# Patient Record
Sex: MALE | Race: BLACK OR AFRICAN AMERICAN | NOT HISPANIC OR LATINO | Employment: UNEMPLOYED | RURAL
[De-identification: names, ages, dates, MRNs, and addresses within clinical notes are randomized per-mention and may not be internally consistent; named-entity substitution may affect disease eponyms.]

---

## 2023-09-21 PROBLEM — E78.5 HYPERLIPIDEMIA: Status: ACTIVE | Noted: 2023-09-21

## 2023-09-27 ENCOUNTER — OFFICE VISIT (OUTPATIENT)
Dept: FAMILY MEDICINE | Facility: CLINIC | Age: 53
End: 2023-09-27
Payer: COMMERCIAL

## 2023-09-27 VITALS
TEMPERATURE: 97 F | HEIGHT: 72 IN | OXYGEN SATURATION: 99 % | SYSTOLIC BLOOD PRESSURE: 141 MMHG | DIASTOLIC BLOOD PRESSURE: 86 MMHG | HEART RATE: 62 BPM | BODY MASS INDEX: 22.08 KG/M2 | WEIGHT: 163 LBS

## 2023-09-27 DIAGNOSIS — Z12.11 ENCOUNTER FOR SCREENING COLONOSCOPY: ICD-10-CM

## 2023-09-27 DIAGNOSIS — E78.5 HYPERLIPIDEMIA, UNSPECIFIED HYPERLIPIDEMIA TYPE: ICD-10-CM

## 2023-09-27 DIAGNOSIS — D17.0 LIPOMA OF HEAD: ICD-10-CM

## 2023-09-27 DIAGNOSIS — D17.9 LIPOMA, UNSPECIFIED SITE: ICD-10-CM

## 2023-09-27 DIAGNOSIS — J44.9 CHRONIC OBSTRUCTIVE PULMONARY DISEASE, UNSPECIFIED COPD TYPE: ICD-10-CM

## 2023-09-27 DIAGNOSIS — M25.551 PAIN OF RIGHT HIP: ICD-10-CM

## 2023-09-27 DIAGNOSIS — Z00.00 HEALTHCARE MAINTENANCE: Primary | ICD-10-CM

## 2023-09-27 DIAGNOSIS — I10 HYPERTENSION, UNSPECIFIED TYPE: ICD-10-CM

## 2023-09-27 LAB
ALBUMIN SERPL BCP-MCNC: 3.6 G/DL (ref 3.5–5)
ALBUMIN/GLOB SERPL: 0.9 {RATIO}
ALP SERPL-CCNC: 113 U/L (ref 45–115)
ALT SERPL W P-5'-P-CCNC: 17 U/L (ref 16–61)
ANION GAP SERPL CALCULATED.3IONS-SCNC: 6 MMOL/L (ref 7–16)
AST SERPL W P-5'-P-CCNC: 19 U/L (ref 15–37)
BASOPHILS # BLD AUTO: 0.01 K/UL (ref 0–0.2)
BASOPHILS NFR BLD AUTO: 0.2 % (ref 0–1)
BILIRUB SERPL-MCNC: 0.4 MG/DL (ref ?–1.2)
BUN SERPL-MCNC: 11 MG/DL (ref 7–18)
BUN/CREAT SERPL: 13 (ref 6–20)
CALCIUM SERPL-MCNC: 9.3 MG/DL (ref 8.5–10.1)
CHLORIDE SERPL-SCNC: 106 MMOL/L (ref 98–107)
CHOLEST SERPL-MCNC: 209 MG/DL (ref 0–200)
CHOLEST/HDLC SERPL: 2.8 {RATIO}
CO2 SERPL-SCNC: 33 MMOL/L (ref 21–32)
CREAT SERPL-MCNC: 0.86 MG/DL (ref 0.7–1.3)
DIFFERENTIAL METHOD BLD: ABNORMAL
EGFR (NO RACE VARIABLE) (RUSH/TITUS): 104 ML/MIN/1.73M2
EOSINOPHIL # BLD AUTO: 0.6 K/UL (ref 0–0.5)
EOSINOPHIL NFR BLD AUTO: 11.1 % (ref 1–4)
ERYTHROCYTE [DISTWIDTH] IN BLOOD BY AUTOMATED COUNT: 14.1 % (ref 11.5–14.5)
EST. AVERAGE GLUCOSE BLD GHB EST-MCNC: 77 MG/DL
GLOBULIN SER-MCNC: 3.8 G/DL (ref 2–4)
GLUCOSE SERPL-MCNC: 82 MG/DL (ref 74–106)
HBA1C MFR BLD HPLC: 4.9 % (ref 4.5–6.6)
HCT VFR BLD AUTO: 44.8 % (ref 40–54)
HCV AB SER QL: NORMAL
HDLC SERPL-MCNC: 75 MG/DL (ref 40–60)
HGB BLD-MCNC: 14.9 G/DL (ref 13.5–18)
HIV 1+O+2 AB SERPL QL: NORMAL
IMM GRANULOCYTES # BLD AUTO: 0.01 K/UL (ref 0–0.04)
IMM GRANULOCYTES NFR BLD: 0.2 % (ref 0–0.4)
LDLC SERPL CALC-MCNC: 111 MG/DL
LDLC/HDLC SERPL: 1.5 {RATIO}
LYMPHOCYTES # BLD AUTO: 1.85 K/UL (ref 1–4.8)
LYMPHOCYTES NFR BLD AUTO: 34.1 % (ref 27–41)
MCH RBC QN AUTO: 29.6 PG (ref 27–31)
MCHC RBC AUTO-ENTMCNC: 33.3 G/DL (ref 32–36)
MCV RBC AUTO: 88.9 FL (ref 80–96)
MONOCYTES # BLD AUTO: 0.53 K/UL (ref 0–0.8)
MONOCYTES NFR BLD AUTO: 9.8 % (ref 2–6)
MPC BLD CALC-MCNC: 9.9 FL (ref 9.4–12.4)
NEUTROPHILS # BLD AUTO: 2.42 K/UL (ref 1.8–7.7)
NEUTROPHILS NFR BLD AUTO: 44.6 % (ref 53–65)
NONHDLC SERPL-MCNC: 134 MG/DL
NRBC # BLD AUTO: 0 X10E3/UL
NRBC, AUTO (.00): 0 %
PLATELET # BLD AUTO: 362 K/UL (ref 150–400)
POTASSIUM SERPL-SCNC: 4.2 MMOL/L (ref 3.5–5.1)
PROT SERPL-MCNC: 7.4 G/DL (ref 6.4–8.2)
RBC # BLD AUTO: 5.04 M/UL (ref 4.6–6.2)
SODIUM SERPL-SCNC: 141 MMOL/L (ref 136–145)
TRIGL SERPL-MCNC: 117 MG/DL (ref 35–150)
VLDLC SERPL-MCNC: 23 MG/DL
WBC # BLD AUTO: 5.42 K/UL (ref 4.5–11)

## 2023-09-27 PROCEDURE — 3044F HG A1C LEVEL LT 7.0%: CPT | Mod: CPTII,,, | Performed by: FAMILY MEDICINE

## 2023-09-27 PROCEDURE — 99203 OFFICE O/P NEW LOW 30 MIN: CPT | Mod: ,,, | Performed by: FAMILY MEDICINE

## 2023-09-27 PROCEDURE — 3079F PR MOST RECENT DIASTOLIC BLOOD PRESSURE 80-89 MM HG: ICD-10-PCS | Mod: CPTII,,, | Performed by: FAMILY MEDICINE

## 2023-09-27 PROCEDURE — 3008F PR BODY MASS INDEX (BMI) DOCUMENTED: ICD-10-PCS | Mod: CPTII,,, | Performed by: FAMILY MEDICINE

## 2023-09-27 PROCEDURE — 3008F BODY MASS INDEX DOCD: CPT | Mod: CPTII,,, | Performed by: FAMILY MEDICINE

## 2023-09-27 PROCEDURE — 83036 HEMOGLOBIN A1C: ICD-10-PCS | Mod: ,,, | Performed by: CLINICAL MEDICAL LABORATORY

## 2023-09-27 PROCEDURE — 3077F PR MOST RECENT SYSTOLIC BLOOD PRESSURE >= 140 MM HG: ICD-10-PCS | Mod: CPTII,,, | Performed by: FAMILY MEDICINE

## 2023-09-27 PROCEDURE — 87389 HIV-1 AG W/HIV-1&-2 AB AG IA: CPT | Mod: ,,, | Performed by: CLINICAL MEDICAL LABORATORY

## 2023-09-27 PROCEDURE — 80053 COMPREHEN METABOLIC PANEL: CPT | Mod: ,,, | Performed by: CLINICAL MEDICAL LABORATORY

## 2023-09-27 PROCEDURE — 3077F SYST BP >= 140 MM HG: CPT | Mod: CPTII,,, | Performed by: FAMILY MEDICINE

## 2023-09-27 PROCEDURE — 85025 COMPLETE CBC W/AUTO DIFF WBC: CPT | Mod: ,,, | Performed by: CLINICAL MEDICAL LABORATORY

## 2023-09-27 PROCEDURE — 86803 HEPATITIS C ANTIBODY: ICD-10-PCS | Mod: ,,, | Performed by: CLINICAL MEDICAL LABORATORY

## 2023-09-27 PROCEDURE — 3044F PR MOST RECENT HEMOGLOBIN A1C LEVEL <7.0%: ICD-10-PCS | Mod: CPTII,,, | Performed by: FAMILY MEDICINE

## 2023-09-27 PROCEDURE — 80061 LIPID PANEL: ICD-10-PCS | Mod: ,,, | Performed by: CLINICAL MEDICAL LABORATORY

## 2023-09-27 PROCEDURE — 80061 LIPID PANEL: CPT | Mod: ,,, | Performed by: CLINICAL MEDICAL LABORATORY

## 2023-09-27 PROCEDURE — 86803 HEPATITIS C AB TEST: CPT | Mod: ,,, | Performed by: CLINICAL MEDICAL LABORATORY

## 2023-09-27 PROCEDURE — 85025 CBC WITH DIFFERENTIAL: ICD-10-PCS | Mod: ,,, | Performed by: CLINICAL MEDICAL LABORATORY

## 2023-09-27 PROCEDURE — 83036 HEMOGLOBIN GLYCOSYLATED A1C: CPT | Mod: ,,, | Performed by: CLINICAL MEDICAL LABORATORY

## 2023-09-27 PROCEDURE — 99203 PR OFFICE/OUTPT VISIT, NEW, LEVL III, 30-44 MIN: ICD-10-PCS | Mod: ,,, | Performed by: FAMILY MEDICINE

## 2023-09-27 PROCEDURE — 87389 HIV 1 / 2 ANTIBODY: ICD-10-PCS | Mod: ,,, | Performed by: CLINICAL MEDICAL LABORATORY

## 2023-09-27 PROCEDURE — 1159F PR MEDICATION LIST DOCUMENTED IN MEDICAL RECORD: ICD-10-PCS | Mod: CPTII,,, | Performed by: FAMILY MEDICINE

## 2023-09-27 PROCEDURE — 3079F DIAST BP 80-89 MM HG: CPT | Mod: CPTII,,, | Performed by: FAMILY MEDICINE

## 2023-09-27 PROCEDURE — 1159F MED LIST DOCD IN RCRD: CPT | Mod: CPTII,,, | Performed by: FAMILY MEDICINE

## 2023-09-27 PROCEDURE — 80053 COMPREHENSIVE METABOLIC PANEL: ICD-10-PCS | Mod: ,,, | Performed by: CLINICAL MEDICAL LABORATORY

## 2023-09-27 RX ORDER — DIMENHYDRINATE 50 MG
50 TABLET ORAL NIGHTLY PRN
COMMUNITY
End: 2023-10-25

## 2023-09-27 RX ORDER — CALCIUM ACETATE 667 MG/1
667 CAPSULE ORAL
COMMUNITY
End: 2024-01-03 | Stop reason: SDUPTHER

## 2023-09-27 RX ORDER — ALBUTEROL SULFATE 90 UG/1
2 AEROSOL, METERED RESPIRATORY (INHALATION) EVERY 4 HOURS PRN
Qty: 18 G | Refills: 1 | Status: SHIPPED | OUTPATIENT
Start: 2023-09-27 | End: 2023-10-27

## 2023-09-27 RX ORDER — OLANZAPINE 10 MG/1
10 TABLET ORAL NIGHTLY
COMMUNITY
End: 2023-10-25

## 2023-09-27 NOTE — PROGRESS NOTES
[address] [facility], [place] [address]  Phone: [phone]     Subjective     Name: Juan Mckeon   YOB: 1970 (52 y.o.)  MRN: [id number]  Visit Date: 9/27/2023   Chief Complaint: Establish Care and Hip Pain (Severe pain in right hip)        HISTORY OF PRESENT ILLNESS:  Mr. Juan Mckeon is a 52 y.o. male with a PMHx of hyperlipidemia, HTN, COPD, & GERD who is present in clinic today to establish care and for severe right hip pain. He was previously being seen by the [facility] but he has insurance now and would like to be seen here. He has multiple conditions going on and has not been on any medication for ~ 1 month now.     Right Hip Pain: His R hip pain has been going on for ~2 years but he recently fell off of a 20 ft ladder while painting a house 2 months ago and it has gotten very severe. He went to the ER at [facility] and did x-rays and he had multiple rib fractures. He was also told that he would need to get a hip replacement and was referred to Ortho but his ins wouldn't cover it. He rates the pain as a 10/10, nothing seems to help it, radiates to his lower back and his RLQ, and shoots down the back of his leg. Patient is clearly in severe pain during evaluation of his R hip. He also hears a loud popping sound in his right groin region. We will get lumbar and hip plain films on him in clinic today and send him another referral to Ortho now that he has ins.     COPD: He has been smoking 1/2 pack of cigarettes everyday for the past 32 years. He was on albuterol previously to control his COPD, we will restart his albuterol and obtain low dose chest CT screening.     HTN: Notes he has history of HTN but has not been on medication. BP elevated today at 141/86, wait on lab results before starting on medication, f/u in 1 week.     Lipoma: Has a large mass on the back of his head that has been there since 2001 with some surrounding masses starting to form. Not painful usually but will  sometimes cause pain that radiates to the front of his left face. Referral to surgery for removal.       PAST MEDICAL HISTORY:  Significant Diagnoses - Patient  has a past medical history of Anxiety, Coronary artery disease, Depression, GERD (gastroesophageal reflux disease), Hyperlipidemia, and Hypertension.  Medications - Patient has a current medication list which includes the following long-term medication(s): albuterol, calcium acetate(phosphat bind), and olanzapine.   Allergies - Patient is allergic to castellani paint [phenol] and nuts [tree nut].  Surgeries - Patient  has a past surgical history that includes Skin graft and Joint replacement.  Family History - Patient family history includes Arthritis in his paternal grandmother; Cancer in his father, maternal aunt, maternal grandmother, and mother.      SOCIAL HISTORY:  Tobacco - Patient  reports that he has been smoking cigarettes. He has never used smokeless tobacco.   Alcohol - Patient  reports current alcohol use.   Recreational Drugs - Patient  reports that he does not currently use drugs.       Review of Systems   Constitutional:  Positive for fatigue. Negative for chills and fever.   HENT:  Negative for nasal congestion, ear pain and rhinorrhea.    Respiratory:  Positive for shortness of breath.    Cardiovascular:  Positive for chest pain.   Gastrointestinal:  Positive for reflux. Negative for abdominal pain, constipation, diarrhea, nausea and vomiting.   Musculoskeletal:  Positive for back pain, gait problem and leg pain.        Past Medical History:   Diagnosis Date    Anxiety     Coronary artery disease     Depression     GERD (gastroesophageal reflux disease)     Hyperlipidemia     Hypertension         Review of patient's allergies indicates:   Allergen Reactions    Castellani paint [phenol]     Nuts [tree nut]         Past Surgical History:   Procedure Laterality Date    JOINT REPLACEMENT      SKIN GRAFT          Family History    Problem Relation Age of Onset    Cancer Mother     Cancer Father     Cancer Maternal Aunt     Cancer Maternal Grandmother     Arthritis Paternal Grandmother        Current Outpatient Medications   Medication Instructions    albuterol (PROVENTIL/VENTOLIN HFA) 90 mcg/actuation inhaler 2 puffs, Every 4 hours PRN    calcium acetate(phosphat bind) (PHOSLO) 667 mg, Oral, 3 times daily with meals    dimenhyDRINATE (DRAMAMINE) 50 mg, Oral, Nightly PRN    doxycycline (VIBRAMYCIN) 100 mg, Oral, 2 times daily    HYDROcodone-acetaminophen (NORCO) 7.5-325 mg per tablet 1 tablet, Oral, Every 6 hours PRN    OLANZapine (ZYPREXA) 10 mg, Oral, Nightly    predniSONE (DELTASONE) 40 mg, Oral        Objective     BP (!) 141/86 (BP Location: Right arm, Patient Position: Sitting, BP Method: Medium (Automatic))   Pulse 62   Temp 97.1 °F (36.2 °C) (Temporal)   Ht 6' (1.829 m)   Wt 73.9 kg (163 lb)   SpO2 99%   BMI 22.11 kg/m²     Physical Exam  Constitutional:       Appearance: Normal appearance. He is normal weight. He is not ill-appearing, toxic-appearing or diaphoretic.   HENT:      Head: Normocephalic.      Right Ear: External ear normal.      Left Ear: External ear normal.      Nose: Nose normal. No congestion or rhinorrhea.   Eyes:      Extraocular Movements: Extraocular movements intact.      Conjunctiva/sclera: Conjunctivae normal.      Pupils: Pupils are equal, round, and reactive to light.   Cardiovascular:      Rate and Rhythm: Normal rate.      Pulses: Normal pulses.      Heart sounds: Normal heart sounds. No murmur heard.     No friction rub. No gallop.   Pulmonary:      Effort: Pulmonary effort is normal. No respiratory distress.      Breath sounds: No stridor. No wheezing, rhonchi or rales.   Chest:      Chest wall: No tenderness.   Abdominal:      General: Abdomen is flat. Bowel sounds are normal.      Palpations: Abdomen is soft.   Musculoskeletal:         General: Tenderness and signs of injury present.       Comments: Right upper hip/groin tenderness to palpation, decreased ROM. Unable to obtain straight leg raise test due to severe pain. No numbness or swelling down bilateral LE's. Sensation intact bilateral LE's.    Skin:     General: Skin is warm.      Coloration: Skin is not jaundiced or pale.      Findings: No bruising, erythema, lesion or rash.   Neurological:      General: No focal deficit present.      Mental Status: He is alert.        All recently obtained labs have been reviewed and discussed in detail with the patient.   Assessment     1. Healthcare maintenance    2. Chronic obstructive pulmonary disease, unspecified COPD type    3. Hypertension, unspecified type    4. Hyperlipidemia, unspecified hyperlipidemia type    5. Lipoma, unspecified site    6. Pain of right hip         Plan     Problem List Items Addressed This Visit          Cardiac/Vascular    Hyperlipidemia    Relevant Orders    Lipid Panel     Other Visit Diagnoses       Healthcare maintenance    -  Primary    Relevant Orders    CBC Auto Differential    Comprehensive Metabolic Panel    Lipid Panel    Hemoglobin A1C    (In Office Administered) Zoster Recombinant Vaccine    Influenza - Quadrivalent *Preferred* (6 months+) (PF)    HIV 1/2 Ag/Ab (4th Gen)    Hepatitis C Antibody    Colonoscopy    CT Chest Lung Screening Low Dose    EGD    Chronic obstructive pulmonary disease, unspecified COPD type        Hypertension, unspecified type        Lipoma, unspecified site        Relevant Orders    Ambulatory referral/consult to General Surgery    Pain of right hip        Relevant Orders    X-Ray Lumbar Spine 5 View    X-Ray Hip 2 or 3 views Right (with Pelvis when performed)              All orders:  Orders Placed This Encounter    X-Ray Lumbar Spine 5 View    X-Ray Hip 2 or 3 views Right (with Pelvis when performed)    CT Chest Lung Screening Low Dose    (In Office Administered) Zoster Recombinant Vaccine    Influenza - Quadrivalent  *Preferred* (6 months+) (PF)    CBC Auto Differential    Comprehensive Metabolic Panel    Lipid Panel    Hemoglobin A1C    HIV 1/2 Ag/Ab (4th Gen)    Hepatitis C Antibody    Ambulatory referral/consult to General Surgery    Colonoscopy    EGD          No follow-ups on file.    Instructed patient that if symptoms fail to improve or worsen patient should seek immediate medical attention or report to the nearest emergency department. Patient expressed verbal agreement and understanding to this plan of care.

## 2023-09-27 NOTE — ASSESSMENT & PLAN NOTE
Lipid panel, HbA1c, CMP, CBC, HIV screening, Hep C screening, schedule colonoscopy, schedule low dose chest CT  Flu shot and Shingles vaccine given today

## 2023-09-27 NOTE — ASSESSMENT & PLAN NOTE
Acute on chronic right hip pain for 2 years with a new injury 2 months ago. Plain films done in clinic today.   Referral to Orthopedics for further evaluation.

## 2023-09-27 NOTE — ASSESSMENT & PLAN NOTE
Obtain lipid panel today, last one done in 4/2022 with elevated Triglycerides, HDL, and LDL   Will consider starting medication once results are back

## 2023-09-27 NOTE — PROGRESS NOTES
Subjective:       Patient ID: Juan Mckeon is a 52 y.o. male.    Chief Complaint: Establish Care and Hip Pain (Severe pain in right hip)    Mr. Juan Mckeon is a 52 y.o. male with a PMHx of hyperlipidemia, HTN, COPD, & GERD who is present in clinic today to establish care and for severe right hip pain. [facility] but he has insurance now and would like to be seen here. He has multiple conditions going on and has not been on any medication for ~ 1 month now.      Right Hip Pain: His R hip pain has been going on for ~2 years but he recently fell off of a 20 ft ladder while painting a house 2 months ago and it has gotten very severe. He went to the ER at [facility] and did x-rays and he had multiple rib fractures. He was also told that he would need to get a hip replacement and was referred to Ortho but his ins wouldn't cover it. He rates the pain as a 10/10, nothing seems to help it, radiates to his lower back and his RLQ, and shoots down the back of his leg. Patient is clearly in severe pain during evaluation of his R hip. He also hears a loud popping sound in his right groin region. Lumbar and hip plain films obtained in clinic today.  We will send another referral to Ortho.      COPD: He has been smoking 1/2 pack of cigarettes everyday for the past 32 years. He was on albuterol previously to control his COPD, we will restart his albuterol and obtain low dose chest CT screening.      HTN: Notes he has history of HTN but has not been on medication. BP elevated today at 141/86, wait on lab results before starting on medication, f/u in 1 week.      Lipoma: Has a large mass on the back of his head that has been there since 2001 with some surrounding masses starting to form. Not painful usually but will sometimes cause pain that radiates to the front of his left face. Referral to surgery for removal.      Current Outpatient Medications:     albuterol (PROVENTIL/VENTOLIN HFA)  90 mcg/actuation inhaler, 2 puffs every 4 (four) hours as needed., Disp: , Rfl:     calcium acetate,phosphat bind, (PHOSLO) 667 mg capsule, Take 667 mg by mouth 3 (three) times daily with meals., Disp: , Rfl:     dimenhyDRINATE (DRAMAMINE) 50 MG tablet, Take 50 mg by mouth nightly as needed., Disp: , Rfl:     doxycycline (VIBRAMYCIN) 100 MG Cap, Take 100 mg by mouth 2 (two) times daily., Disp: , Rfl:     HYDROcodone-acetaminophen (NORCO) 7.5-325 mg per tablet, Take 1 tablet by mouth every 6 (six) hours as needed., Disp: , Rfl:     OLANZapine (ZYPREXA) 10 MG tablet, Take 10 mg by mouth every evening., Disp: , Rfl:     predniSONE (DELTASONE) 20 MG tablet, Take 40 mg by mouth., Disp: , Rfl:     Review of patient's allergies indicates:   Allergen Reactions    Castellani paint [phenol]     Nuts [tree nut]        Past Medical History:   Diagnosis Date    Anxiety     Coronary artery disease     Depression     GERD (gastroesophageal reflux disease)     Hyperlipidemia     Hypertension        Past Surgical History:   Procedure Laterality Date    JOINT REPLACEMENT      SKIN GRAFT         Family History   Problem Relation Age of Onset    Cancer Mother     Cancer Father     Cancer Maternal Aunt     Cancer Maternal Grandmother     Arthritis Paternal Grandmother        Social History     Tobacco Use    Smoking status: Every Day     Types: Cigarettes    Smokeless tobacco: Never   Substance Use Topics    Alcohol use: Yes    Drug use: Not Currently       Review of Systems   Constitutional:  Positive for fatigue. Negative for fever.   Respiratory:  Positive for shortness of breath. Negative for cough.    Gastrointestinal:  Negative for constipation, diarrhea, nausea and vomiting.   Musculoskeletal:  Positive for arthralgias, back pain, gait problem and myalgias.         Objective:      Vitals:    09/27/23 0913 09/27/23 0923   BP: (!) 143/83 (!) 141/86   BP Location: Right arm Right arm   Patient Position: Sitting Sitting   BP Method:  Medium (Automatic) Medium (Automatic)   Pulse: (!) 58 62   Temp: 97.1 °F (36.2 °C)    TempSrc: Temporal    SpO2: 98% 99%   Weight: 73.9 kg (163 lb)    Height: 6' (1.829 m)      Physical Exam  Vitals and nursing note reviewed.   Constitutional:       General: He is not in acute distress.     Appearance: Normal appearance. He is normal weight. He is not ill-appearing, toxic-appearing or diaphoretic.   HENT:      Head: Normocephalic.      Comments: 2.5 x 2.5 cm soft mass on left posterior scalp with 2-3 surrounding masses. Non mobile, no tenderness to palpation.      Right Ear: External ear normal.      Left Ear: External ear normal.      Nose: Nose normal. No congestion or rhinorrhea.   Eyes:      Extraocular Movements: Extraocular movements intact.      Conjunctiva/sclera: Conjunctivae normal.      Pupils: Pupils are equal, round, and reactive to light.   Cardiovascular:      Rate and Rhythm: Normal rate and regular rhythm.      Pulses: Normal pulses.      Heart sounds: Normal heart sounds. No murmur heard.     No friction rub. No gallop.   Pulmonary:      Effort: Pulmonary effort is normal. No respiratory distress.      Breath sounds: No stridor. No wheezing, rhonchi or rales.   Chest:      Chest wall: No tenderness.   Abdominal:      General: Abdomen is flat. Bowel sounds are normal.      Palpations: Abdomen is soft.   Musculoskeletal:         General: Tenderness present.      Comments: Tenderness to palpation on right anterior proximal femoral area. No tenderness to bilateral lumbar region. Seated leg raise test deferred due to severe pain leaning back. Restricted ROM, gait difficulty. No numbness or swelling of bilateral LE's. Sensation intact bilateral LE's.    Skin:     General: Skin is warm.      Coloration: Skin is not jaundiced or pale.      Findings: No bruising, erythema, lesion or rash.   Neurological:      Mental Status: He is alert.       Lab Results   Component Value Date    WBC 6.73 04/12/2022    HGB  13.7 04/12/2022    HCT 42.3 04/12/2022     04/12/2022    CHOL 223 (H) 04/12/2022    TRIG 343 (H) 04/12/2022    HDL 75 (H) 04/12/2022    ALT 21 04/12/2022    AST 21 04/12/2022     07/12/2022    K 4.4 07/12/2022     07/12/2022    CREATININE 1.06 07/12/2022    BUN 11 07/12/2022    CO2 27 07/12/2022    TSH 0.923 07/12/2022      Assessment:       1. Healthcare maintenance    2. Chronic obstructive pulmonary disease, unspecified COPD type    3. Hypertension, unspecified type    4. Hyperlipidemia, unspecified hyperlipidemia type    5. Lipoma, unspecified site    6. Pain of right hip        Plan:         Problem List Items Addressed This Visit          Pulmonary    COPD (chronic obstructive pulmonary disease)     Chronic smoker  Start Albuterol             Cardiac/Vascular    Hyperlipidemia     Obtain lipid panel today, last one done in 4/2022 with elevated Triglycerides, HDL, and LDL   Will consider starting medication once results are back         Relevant Orders    Lipid Panel    Hypertension     Monitor blood pressure   F/u in 1 week for re-evaluation             Oncology    Lipoma     Lipoma on left posterior scalp.   Referral to General Surgery.          Relevant Orders    Ambulatory referral/consult to General Surgery       Orthopedic    Pain of right hip     Acute on chronic right hip pain for 2 years with a new injury 2 months ago. Plain films done in clinic today.   Referral to Orthopedics for further evaluation.          Relevant Orders    X-Ray Lumbar Spine 5 View    X-Ray Hip 2 or 3 views Right (with Pelvis when performed)    Ambulatory referral/consult to Orthopedics       Other    Healthcare maintenance - Primary     Lipid panel, HbA1c, CMP, CBC, HIV screening, Hep C screening, schedule colonoscopy, schedule low dose chest CT  Flu shot and Shingles vaccine given today          Relevant Orders    CBC Auto Differential    Comprehensive Metabolic Panel    Lipid Panel    Hemoglobin A1C    (In  Office Administered) Zoster Recombinant Vaccine    Influenza - Quadrivalent *Preferred* (6 months+) (PF)    HIV 1/2 Ag/Ab (4th Gen)    Hepatitis C Antibody    CT Chest Lung Screening Low Dose    EGD    Ambulatory referral/consult to Gastroenterology         Follow up in about 1 week (around 10/4/2023) for HTN, right hip pain, health maintenance, medication mgmt .    Molly Posey MD

## 2023-09-29 DIAGNOSIS — Z12.11 SCREENING FOR COLON CANCER: Primary | ICD-10-CM

## 2023-09-29 RX ORDER — POLYETHYLENE GLYCOL 3350, SODIUM SULFATE ANHYDROUS, SODIUM BICARBONATE, SODIUM CHLORIDE, POTASSIUM CHLORIDE 236; 22.74; 6.74; 5.86; 2.97 G/4L; G/4L; G/4L; G/4L; G/4L
4 POWDER, FOR SOLUTION ORAL ONCE
Qty: 4000 ML | Refills: 0 | Status: SHIPPED | OUTPATIENT
Start: 2023-09-29 | End: 2023-09-29

## 2023-10-04 ENCOUNTER — OFFICE VISIT (OUTPATIENT)
Dept: FAMILY MEDICINE | Facility: CLINIC | Age: 53
End: 2023-10-04
Payer: COMMERCIAL

## 2023-10-04 VITALS
HEIGHT: 72 IN | DIASTOLIC BLOOD PRESSURE: 84 MMHG | BODY MASS INDEX: 22.08 KG/M2 | HEART RATE: 73 BPM | SYSTOLIC BLOOD PRESSURE: 137 MMHG | WEIGHT: 163 LBS | TEMPERATURE: 97 F | OXYGEN SATURATION: 95 %

## 2023-10-04 DIAGNOSIS — M87.051 AVASCULAR NECROSIS OF BONE OF RIGHT HIP: ICD-10-CM

## 2023-10-04 DIAGNOSIS — Z23 NEED FOR INFLUENZA VACCINATION: Primary | ICD-10-CM

## 2023-10-04 DIAGNOSIS — Z00.00 HEALTHCARE MAINTENANCE: ICD-10-CM

## 2023-10-04 PROCEDURE — 90471 FLU VACCINE (QUAD) GREATER THAN OR EQUAL TO 3YO PRESERVATIVE FREE IM: ICD-10-PCS | Mod: GC,,, | Performed by: FAMILY MEDICINE

## 2023-10-04 PROCEDURE — 3008F PR BODY MASS INDEX (BMI) DOCUMENTED: ICD-10-PCS | Mod: CPTII,,, | Performed by: FAMILY MEDICINE

## 2023-10-04 PROCEDURE — 1159F MED LIST DOCD IN RCRD: CPT | Mod: CPTII,,, | Performed by: FAMILY MEDICINE

## 2023-10-04 PROCEDURE — 99213 PR OFFICE/OUTPT VISIT, EST, LEVL III, 20-29 MIN: ICD-10-PCS | Mod: 25,GC,, | Performed by: FAMILY MEDICINE

## 2023-10-04 PROCEDURE — 1159F PR MEDICATION LIST DOCUMENTED IN MEDICAL RECORD: ICD-10-PCS | Mod: CPTII,,, | Performed by: FAMILY MEDICINE

## 2023-10-04 PROCEDURE — 3075F PR MOST RECENT SYSTOLIC BLOOD PRESS GE 130-139MM HG: ICD-10-PCS | Mod: CPTII,,, | Performed by: FAMILY MEDICINE

## 2023-10-04 PROCEDURE — 3075F SYST BP GE 130 - 139MM HG: CPT | Mod: CPTII,,, | Performed by: FAMILY MEDICINE

## 2023-10-04 PROCEDURE — 3044F PR MOST RECENT HEMOGLOBIN A1C LEVEL <7.0%: ICD-10-PCS | Mod: CPTII,,, | Performed by: FAMILY MEDICINE

## 2023-10-04 PROCEDURE — 99213 OFFICE O/P EST LOW 20 MIN: CPT | Mod: 25,GC,, | Performed by: FAMILY MEDICINE

## 2023-10-04 PROCEDURE — 3079F PR MOST RECENT DIASTOLIC BLOOD PRESSURE 80-89 MM HG: ICD-10-PCS | Mod: CPTII,,, | Performed by: FAMILY MEDICINE

## 2023-10-04 PROCEDURE — 3079F DIAST BP 80-89 MM HG: CPT | Mod: CPTII,,, | Performed by: FAMILY MEDICINE

## 2023-10-04 PROCEDURE — 90686 FLU VACCINE (QUAD) GREATER THAN OR EQUAL TO 3YO PRESERVATIVE FREE IM: ICD-10-PCS | Mod: GC,,, | Performed by: FAMILY MEDICINE

## 2023-10-04 PROCEDURE — 90677 PNEUMOCOCCAL CONJUGATE VACCINE 20-VALENT: ICD-10-PCS | Mod: ,,, | Performed by: FAMILY MEDICINE

## 2023-10-04 PROCEDURE — 3008F BODY MASS INDEX DOCD: CPT | Mod: CPTII,,, | Performed by: FAMILY MEDICINE

## 2023-10-04 PROCEDURE — 90686 IIV4 VACC NO PRSV 0.5 ML IM: CPT | Mod: GC,,, | Performed by: FAMILY MEDICINE

## 2023-10-04 PROCEDURE — 90472 IMMUNIZATION ADMIN EACH ADD: CPT | Mod: GC,,, | Performed by: FAMILY MEDICINE

## 2023-10-04 PROCEDURE — 3044F HG A1C LEVEL LT 7.0%: CPT | Mod: CPTII,,, | Performed by: FAMILY MEDICINE

## 2023-10-04 PROCEDURE — 90677 PCV20 VACCINE IM: CPT | Mod: ,,, | Performed by: FAMILY MEDICINE

## 2023-10-04 PROCEDURE — 90471 IMMUNIZATION ADMIN: CPT | Mod: GC,,, | Performed by: FAMILY MEDICINE

## 2023-10-04 PROCEDURE — 90472 PNEUMOCOCCAL CONJUGATE VACCINE 20-VALENT: ICD-10-PCS | Mod: GC,,, | Performed by: FAMILY MEDICINE

## 2023-10-04 RX ORDER — HYDROCODONE BITARTRATE AND ACETAMINOPHEN 7.5; 325 MG/1; MG/1
1 TABLET ORAL EVERY 6 HOURS PRN
Qty: 30 TABLET | Refills: 0 | Status: SHIPPED | OUTPATIENT
Start: 2023-10-04 | End: 2023-10-04

## 2023-10-04 RX ORDER — DICLOFENAC SODIUM 10 MG/G
2 GEL TOPICAL 4 TIMES DAILY
Qty: 100 G | Refills: 0 | OUTPATIENT
Start: 2023-10-04 | End: 2023-10-04

## 2023-10-04 RX ORDER — HYDROCODONE BITARTRATE AND ACETAMINOPHEN 7.5; 325 MG/1; MG/1
1 TABLET ORAL EVERY 6 HOURS PRN
Qty: 30 TABLET | Refills: 0 | Status: SHIPPED | OUTPATIENT
Start: 2023-10-04 | End: 2023-10-12

## 2023-10-04 RX ORDER — DICLOFENAC SODIUM 10 MG/G
2 GEL TOPICAL 4 TIMES DAILY
Qty: 100 G | Refills: 0 | Status: SHIPPED | OUTPATIENT
Start: 2023-10-04 | End: 2023-10-04

## 2023-10-04 RX ORDER — DICLOFENAC SODIUM 10 MG/G
2 GEL TOPICAL 4 TIMES DAILY
Qty: 100 G | Refills: 0 | Status: ON HOLD | OUTPATIENT
Start: 2023-10-04 | End: 2023-10-30 | Stop reason: HOSPADM

## 2023-10-04 NOTE — PROGRESS NOTES
Subjective:       Patient ID: Juan Mckeon is a 52 y.o. male.    Chief Complaint: Follow-up, COPD, Hyperlipidemia, Hypertension, and Hip Pain    Pt is a 51 y/o male who states his hip pain is getting worse. Last week an R hip xray was done demonstrating Avascular necrosis of R hip secondary to fracture with severe  arthritis.  Pt reports pain is severe and he is not sleeping much secondary to increased pain.  On Exam Pt is N/V intact at Right toes/L toes.   Pt returns this week to review initial labs and Catch up on Care gaps. Pt will receive influenza and pneumococcal vaccination today.Pt has Colonoscopy scheduled for March 21 2024. Low dose CT scheduled as well  I contacted Ortho ([facility]) wh has an appointment scheduled for 10/10/23 to evaluated hip. Pt will be RX diclofenac and a 8 days supply Of norco. Pt is aware Norco will not be prescribed again at this clinic. Pt will be referred to pain management if necessary. Pt reports he will keep appointment with Ortho.  CBC,CMP,A1C, Hep c screening, HIV screening discussed with pt at visit. No changes in current plan.        Current Outpatient Medications:     albuterol (PROVENTIL/VENTOLIN HFA) 90 mcg/actuation inhaler, Inhale 2 puffs into the lungs every 4 (four) hours as needed for Wheezing., Disp: 18 g, Rfl: 1    calcium acetate,phosphat bind, (PHOSLO) 667 mg capsule, Take 667 mg by mouth 3 (three) times daily with meals., Disp: , Rfl:     dimenhyDRINATE (DRAMAMINE) 50 MG tablet, Take 50 mg by mouth nightly as needed., Disp: , Rfl:     OLANZapine (ZYPREXA) 10 MG tablet, Take 10 mg by mouth every evening., Disp: , Rfl:     diclofenac sodium (VOLTAREN) 1 % Gel, Apply 2 g topically 4 (four) times daily. for 14 days, Disp: 100 g, Rfl: 0    HYDROcodone-acetaminophen (NORCO) 7.5-325 mg per tablet, Take 1 tablet by mouth every 6 (six) hours as needed for Pain., Disp: 30 tablet, Rfl: 0    Review of patient's allergies indicates:   Allergen Reactions    Castellani paint  [phenol]     Nuts [tree nut]        Past Medical History:   Diagnosis Date    Anxiety     Coronary artery disease     Depression     GERD (gastroesophageal reflux disease)     Hyperlipidemia     Hypertension        Past Surgical History:   Procedure Laterality Date    JOINT REPLACEMENT      SKIN GRAFT         Family History   Problem Relation Age of Onset    Cancer Mother     Cancer Father     Cancer Maternal Aunt     Cancer Maternal Grandmother     Arthritis Paternal Grandmother        Social History     Tobacco Use    Smoking status: Every Day     Types: Cigarettes    Smokeless tobacco: Never   Substance Use Topics    Alcohol use: Yes    Drug use: Not Currently       Review of Systems   Constitutional:  Negative for diaphoresis, fatigue, fever and unexpected weight change.   HENT:  Negative for rhinorrhea, sinus pressure/congestion and sneezing.    Respiratory:  Negative for cough, chest tightness, shortness of breath and wheezing.    Cardiovascular:  Negative for chest pain, palpitations and leg swelling.   Gastrointestinal:  Negative for constipation, diarrhea, nausea and vomiting.   Genitourinary:  Negative for difficulty urinating.   Musculoskeletal:  Positive for arthralgias, gait problem and leg pain. Negative for back pain.   Neurological:  Negative for dizziness, weakness, light-headedness and headaches.           Objective:      Vitals:    10/04/23 1021   BP: 137/84   BP Location: Left arm   Patient Position: Sitting   BP Method: Medium (Automatic)   Pulse: 73   Temp: 97.1 °F (36.2 °C)   TempSrc: Temporal   SpO2: 95%   Weight: 73.9 kg (163 lb)   Height: 6' (1.829 m)     Physical Exam  Constitutional:       Appearance: Normal appearance.   HENT:      Head: Normocephalic and atraumatic.      Right Ear: External ear normal.      Left Ear: External ear normal.      Mouth/Throat:      Mouth: Mucous membranes are moist.      Pharynx: Oropharynx is clear.   Eyes:      Extraocular Movements: Extraocular movements  intact.      Pupils: Pupils are equal, round, and reactive to light.   Cardiovascular:      Rate and Rhythm: Normal rate and regular rhythm.      Heart sounds: Normal heart sounds.   Pulmonary:      Effort: Pulmonary effort is normal.      Breath sounds: Normal breath sounds.   Abdominal:      Palpations: Abdomen is soft.   Musculoskeletal:      Cervical back: Neck supple.      Comments: Severe limitation of ROM Right hip. No erythema no edema right hip. N/V intact at R toes   Skin:     General: Skin is warm and dry.      Capillary Refill: Capillary refill takes less than 2 seconds.   Neurological:      Mental Status: He is alert and oriented to person, place, and time.   Psychiatric:         Mood and Affect: Mood normal.         Behavior: Behavior normal.         Lab Results   Component Value Date    WBC 5.42 09/27/2023    HGB 14.9 09/27/2023    HCT 44.8 09/27/2023     09/27/2023    CHOL 209 (H) 09/27/2023    TRIG 117 09/27/2023    HDL 75 (H) 09/27/2023    ALT 17 09/27/2023    AST 19 09/27/2023     09/27/2023    K 4.2 09/27/2023     09/27/2023    CREATININE 0.86 09/27/2023    BUN 11 09/27/2023    CO2 33 (H) 09/27/2023    TSH 0.923 07/12/2022    HGBA1C 4.9 09/27/2023      Assessment:       1. Need for influenza vaccination    2. Healthcare maintenance    3. Avascular necrosis of bone of right hip        Plan:         Problem List Items Addressed This Visit          ID    Need for influenza vaccination - Primary    Relevant Orders    Influenza - Quadrivalent *Preferred* (6 months+) (PF) (Completed)       Orthopedic    Avascular necrosis of bone of right hip     Norco, diclofenac  Keep appointment with ORTHO on 10/10/23         Relevant Medications    HYDROcodone-acetaminophen (NORCO) 7.5-325 mg per tablet       Other    Healthcare maintenance    Relevant Orders    (In Office Administered) Pneumococcal Conjugate Vaccine (20 Valent) (IM) (Preferred) (Completed)         Follow up in about 3 months  (around 1/4/2024).    Molly Posey MD

## 2023-10-10 ENCOUNTER — OFFICE VISIT (OUTPATIENT)
Dept: ORTHOPEDICS | Facility: CLINIC | Age: 53
End: 2023-10-10
Payer: COMMERCIAL

## 2023-10-10 VITALS — HEIGHT: 72 IN | BODY MASS INDEX: 21.67 KG/M2 | WEIGHT: 160 LBS

## 2023-10-10 DIAGNOSIS — M25.551 PAIN OF RIGHT HIP: ICD-10-CM

## 2023-10-10 DIAGNOSIS — M87.051 AVASCULAR NECROSIS OF BONE OF RIGHT HIP: Primary | ICD-10-CM

## 2023-10-10 DIAGNOSIS — Z01.811 PRE-OPERATIVE RESPIRATORY EXAMINATION: ICD-10-CM

## 2023-10-10 DIAGNOSIS — Z01.812 PRE-OPERATIVE LABORATORY EXAMINATION: ICD-10-CM

## 2023-10-10 DIAGNOSIS — Z01.810 PRE-OPERATIVE CARDIOVASCULAR EXAMINATION: ICD-10-CM

## 2023-10-10 PROCEDURE — 1159F MED LIST DOCD IN RCRD: CPT | Mod: CPTII,,, | Performed by: ORTHOPAEDIC SURGERY

## 2023-10-10 PROCEDURE — 99204 OFFICE O/P NEW MOD 45 MIN: CPT | Mod: S$PBB,,, | Performed by: ORTHOPAEDIC SURGERY

## 2023-10-10 PROCEDURE — 1159F PR MEDICATION LIST DOCUMENTED IN MEDICAL RECORD: ICD-10-PCS | Mod: CPTII,,, | Performed by: ORTHOPAEDIC SURGERY

## 2023-10-10 PROCEDURE — 3008F BODY MASS INDEX DOCD: CPT | Mod: CPTII,,, | Performed by: ORTHOPAEDIC SURGERY

## 2023-10-10 PROCEDURE — 99204 PR OFFICE/OUTPT VISIT, NEW, LEVL IV, 45-59 MIN: ICD-10-PCS | Mod: S$PBB,,, | Performed by: ORTHOPAEDIC SURGERY

## 2023-10-10 PROCEDURE — 3044F PR MOST RECENT HEMOGLOBIN A1C LEVEL <7.0%: ICD-10-PCS | Mod: CPTII,,, | Performed by: ORTHOPAEDIC SURGERY

## 2023-10-10 PROCEDURE — 3008F PR BODY MASS INDEX (BMI) DOCUMENTED: ICD-10-PCS | Mod: CPTII,,, | Performed by: ORTHOPAEDIC SURGERY

## 2023-10-10 PROCEDURE — 3044F HG A1C LEVEL LT 7.0%: CPT | Mod: CPTII,,, | Performed by: ORTHOPAEDIC SURGERY

## 2023-10-10 PROCEDURE — 99215 OFFICE O/P EST HI 40 MIN: CPT | Mod: PBBFAC | Performed by: ORTHOPAEDIC SURGERY

## 2023-10-10 NOTE — PATIENT INSTRUCTIONS
Surgery Instructions     Your surgery is scheduled for 10/30/23 at [facility] on the   ground floor of the Ambulatory building. You should arrive at 5:30 at the   Ambulatory Care Center located at [address].    Pre Op Testing: TODAY- 20      ____ Lab  (1st floor clinic)   ____ EKG  (2nd floor clinic)  ____ Chest xray (Imaging Center)     Pre Op Clearance: DR. MORFIN 10/25 @ 9:30    Our office will contact you the day before surgery with your arrival time.  DO NOT eat or drink anything after midnight the night before surgery (this includes gum, candy, chewing tobacco, etc).  You CAN NOT drive after surgery, please arrange for someone to drive you.  Bring all medication in their original bottles.  Bathe with Hibiclens the night or morning before your surgery.  The morning of your surgery ONLY take blood pressure, heart, acid reflux, or thyroid (if you take a morning dose) medication with a sip of water.   Be sure to have stopped your blood thinner medication at the appropriate time, as instructed.  Bring your C-Pap machine if you have one.  All jewelry, piercings, or false eyelashes MUST be removed prior to surgery.

## 2023-10-10 NOTE — PROGRESS NOTES
CC:   Chief Complaint   Patient presents with    Right Hip - Pain        PREVIOUS INFO:        HISTORY:   10/10/2023    Juan Mckeon  is a 52 y.o. 52-year-old black male it has been on a cane for 6 months with right hip pain it has been hurting for at least 2 years he is had x-rays showing AVN with the subchondral fracture collapse the right femoral head is referred in      PAST MEDICAL HISTORY:   Past Medical History:   Diagnosis Date    Anxiety     Coronary artery disease     Depression     GERD (gastroesophageal reflux disease)     Hyperlipidemia     Hypertension           PAST SURGICAL HISTORY:   Past Surgical History:   Procedure Laterality Date    SKIN GRAFT            ALLERGIES:   Review of patient's allergies indicates:   Allergen Reactions    Castellani paint [phenol]     Nuts [tree nut]         MEDICATIONS :    Current Outpatient Medications:     albuterol (PROVENTIL/VENTOLIN HFA) 90 mcg/actuation inhaler, Inhale 2 puffs into the lungs every 4 (four) hours as needed for Wheezing., Disp: 18 g, Rfl: 1    calcium acetate,phosphat bind, (PHOSLO) 667 mg capsule, Take 667 mg by mouth 3 (three) times daily with meals., Disp: , Rfl:     diclofenac sodium (VOLTAREN) 1 % Gel, Apply 2 g topically 4 (four) times daily. for 14 days, Disp: 100 g, Rfl: 0    dimenhyDRINATE (DRAMAMINE) 50 MG tablet, Take 50 mg by mouth nightly as needed., Disp: , Rfl:     HYDROcodone-acetaminophen (NORCO) 7.5-325 mg per tablet, Take 1 tablet by mouth every 6 (six) hours as needed for Pain., Disp: 30 tablet, Rfl: 0    OLANZapine (ZYPREXA) 10 MG tablet, Take 10 mg by mouth every evening., Disp: , Rfl:      SOCIAL HISTORY:   Social History     Socioeconomic History    Marital status:    Tobacco Use    Smoking status: Every Day     Types: Cigarettes    Smokeless tobacco: Never   Substance and Sexual Activity    Alcohol use: Yes    Drug use: Not Currently    Sexual activity: Yes        ROS    FAMILY HISTORY:   Family  History   Problem Relation Age of Onset    Cancer Mother     Cancer Father     Cancer Maternal Aunt     Cancer Maternal Grandmother     Arthritis Paternal Grandmother           PHYSICAL EXAM: There were no vitals filed for this visit.            Body mass index is 21.7 kg/m².     In general, this is a well-developed, well-nourished male . The patient is alert, oriented and cooperative.      HEENT:  Normocephalic, atraumatic.  Extraocular movements are intact bilaterally.  The oropharynx is benign.       NECK:  Nontender with good range of motion.      PULMONARY: Respirations are even and non-labored.       CARDIOVASCULAR: Pulses regular by peripheral palpation.       ABDOMEN:  Soft, non-tender, non-distended.        EXTREMITIES:  Right lower extremity palpable he has 90° of forward flexion of his hip 20° of external rotation 5° internal rotation all with pain    Ortho Exam      RADIOGRAPHIC FINDINGS:  No x-rays today right hip x-rays reviewed show femoral head AVN with collapse subchondral fracture      .      IMPRESSION:  Right hip AVN with subchondral fracture and collapse risks benefits discussed at length total hip replacement    PLAN:  Right total hip replacement long rehab course discussed he does desire proceed we will get a clearance and set him up for right total hip      I had a long discussion with the patient about treatment options, including operative and nonoperative treatments. We discussed pros and cons of each including risks pertinent to surgery including pain, infection, bleeding, damage to adjacent structures like nerves and blood vessels, failure to heal, need for future surgeries, stiffness, instability, loss of limb, anesthesia risks like stroke, blood clot, loss of life. We discussed the possibility of need for later hardware removal in the case that hardware was used. We discussed common and uncommon risks, and discussed patient specific factors that may increase the risks present with  surgery. All questions were answered. The patient expressed understanding of the pros and cons of surgery and wanted to proceed with surgical treatment.         Deacon Gu III      (Subject to voice recognition error, transcription service not allowed)

## 2023-10-20 ENCOUNTER — CLINICAL SUPPORT (OUTPATIENT)
Dept: CARDIOLOGY | Facility: CLINIC | Age: 53
End: 2023-10-20
Payer: COMMERCIAL

## 2023-10-20 ENCOUNTER — HOSPITAL ENCOUNTER (OUTPATIENT)
Dept: RADIOLOGY | Facility: HOSPITAL | Age: 53
Discharge: HOME | End: 2023-10-20
Attending: ORTHOPAEDIC SURGERY
Payer: COMMERCIAL

## 2023-10-20 DIAGNOSIS — Z01.810 PRE-OPERATIVE CARDIOVASCULAR EXAMINATION: ICD-10-CM

## 2023-10-20 DIAGNOSIS — Z01.811 PRE-OPERATIVE RESPIRATORY EXAMINATION: ICD-10-CM

## 2023-10-20 PROCEDURE — 93005 ELECTROCARDIOGRAM TRACING: CPT | Mod: PBBFAC | Performed by: INTERNAL MEDICINE

## 2023-10-20 PROCEDURE — 93010 ELECTROCARDIOGRAM REPORT: CPT | Mod: S$PBB,,, | Performed by: INTERNAL MEDICINE

## 2023-10-20 PROCEDURE — 85730 THROMBOPLASTIN TIME PARTIAL: CPT | Performed by: ORTHOPAEDIC SURGERY

## 2023-10-20 PROCEDURE — 85610 PROTHROMBIN TIME: CPT | Performed by: ORTHOPAEDIC SURGERY

## 2023-10-20 PROCEDURE — 71046 X-RAY EXAM CHEST 2 VIEWS: CPT | Mod: 26,,, | Performed by: RADIOLOGY

## 2023-10-20 PROCEDURE — 71046 X-RAY EXAM CHEST 2 VIEWS: CPT | Mod: TC

## 2023-10-20 PROCEDURE — 99212 OFFICE O/P EST SF 10 MIN: CPT | Mod: PBBFAC,25

## 2023-10-20 PROCEDURE — 71046 XR CHEST PA AND LATERAL: ICD-10-PCS | Mod: 26,,, | Performed by: RADIOLOGY

## 2023-10-20 PROCEDURE — 93010 EKG 12-LEAD: ICD-10-PCS | Mod: S$PBB,,, | Performed by: INTERNAL MEDICINE

## 2023-10-23 ENCOUNTER — HOSPITAL ENCOUNTER (EMERGENCY)
Facility: HOSPITAL | Age: 53
Discharge: HOME | End: 2023-10-23
Payer: COMMERCIAL

## 2023-10-23 ENCOUNTER — TELEPHONE (OUTPATIENT)
Dept: ORTHOPEDICS | Facility: CLINIC | Age: 53
End: 2023-10-23
Payer: COMMERCIAL

## 2023-10-23 VITALS
BODY MASS INDEX: 21.67 KG/M2 | DIASTOLIC BLOOD PRESSURE: 85 MMHG | HEIGHT: 72 IN | HEART RATE: 73 BPM | WEIGHT: 160 LBS | OXYGEN SATURATION: 96 % | RESPIRATION RATE: 12 BRPM | TEMPERATURE: 99 F | SYSTOLIC BLOOD PRESSURE: 147 MMHG

## 2023-10-23 DIAGNOSIS — R07.9 CHEST PAIN: ICD-10-CM

## 2023-10-23 DIAGNOSIS — M25.519 SHOULDER PAIN, UNSPECIFIED CHRONICITY, UNSPECIFIED LATERALITY: Primary | ICD-10-CM

## 2023-10-23 DIAGNOSIS — J18.9 PNEUMONIA OF LEFT LOWER LOBE DUE TO INFECTIOUS ORGANISM: Primary | ICD-10-CM

## 2023-10-23 LAB
ALBUMIN SERPL BCP-MCNC: 3.6 G/DL (ref 3.5–5)
ALBUMIN/GLOB SERPL: 0.9 {RATIO}
ALP SERPL-CCNC: 117 U/L (ref 45–115)
ALT SERPL W P-5'-P-CCNC: 16 U/L (ref 16–61)
AMPHET UR QL SCN: POSITIVE
ANION GAP SERPL CALCULATED.3IONS-SCNC: 14 MMOL/L (ref 7–16)
APTT PPP: 34.3 SECONDS (ref 25.2–37.3)
AST SERPL W P-5'-P-CCNC: 16 U/L (ref 15–37)
BARBITURATES UR QL SCN: NEGATIVE
BASOPHILS # BLD AUTO: 0.02 K/UL (ref 0–0.2)
BASOPHILS NFR BLD AUTO: 0.3 % (ref 0–1)
BENZODIAZ METAB UR QL SCN: NEGATIVE
BILIRUB SERPL-MCNC: 0.4 MG/DL (ref ?–1.2)
BUN SERPL-MCNC: 7 MG/DL (ref 7–18)
BUN/CREAT SERPL: 6 (ref 6–20)
CALCIUM SERPL-MCNC: 9.4 MG/DL (ref 8.5–10.1)
CANNABINOIDS UR QL SCN: POSITIVE
CHLORIDE SERPL-SCNC: 106 MMOL/L (ref 98–107)
CO2 SERPL-SCNC: 25 MMOL/L (ref 21–32)
COCAINE UR QL SCN: NEGATIVE
CREAT SERPL-MCNC: 1.15 MG/DL (ref 0.7–1.3)
D DIMER PPP FEU-MCNC: 0.33 ΜG/ML (ref 0–0.47)
DIFFERENTIAL METHOD BLD: ABNORMAL
EGFR (NO RACE VARIABLE) (RUSH/TITUS): 77 ML/MIN/1.73M2
EOSINOPHIL # BLD AUTO: 1.34 K/UL (ref 0–0.5)
EOSINOPHIL NFR BLD AUTO: 20.3 % (ref 1–4)
ERYTHROCYTE [DISTWIDTH] IN BLOOD BY AUTOMATED COUNT: 13 % (ref 11.5–14.5)
GLOBULIN SER-MCNC: 3.9 G/DL (ref 2–4)
GLUCOSE SERPL-MCNC: 89 MG/DL (ref 74–106)
HCT VFR BLD AUTO: 41.2 % (ref 40–54)
HGB BLD-MCNC: 14.6 G/DL (ref 13.5–18)
IMM GRANULOCYTES # BLD AUTO: 0.02 K/UL (ref 0–0.04)
IMM GRANULOCYTES NFR BLD: 0.3 % (ref 0–0.4)
INR BLD: 0.88
LYMPHOCYTES # BLD AUTO: 2.06 K/UL (ref 1–4.8)
LYMPHOCYTES NFR BLD AUTO: 31.3 % (ref 27–41)
MAGNESIUM SERPL-MCNC: 2 MG/DL (ref 1.7–2.3)
MCH RBC QN AUTO: 29.7 PG (ref 27–31)
MCHC RBC AUTO-ENTMCNC: 35.4 G/DL (ref 32–36)
MCV RBC AUTO: 83.9 FL (ref 80–96)
MONOCYTES # BLD AUTO: 0.56 K/UL (ref 0–0.8)
MONOCYTES NFR BLD AUTO: 8.5 % (ref 2–6)
MPC BLD CALC-MCNC: 9.3 FL (ref 9.4–12.4)
NEUTROPHILS # BLD AUTO: 2.59 K/UL (ref 1.8–7.7)
NEUTROPHILS NFR BLD AUTO: 39.3 % (ref 53–65)
NRBC # BLD AUTO: 0 X10E3/UL
NRBC, AUTO (.00): 0 %
NT-PROBNP SERPL-MCNC: 39 PG/ML (ref 1–125)
OPIATES UR QL SCN: NEGATIVE
PCP UR QL SCN: NEGATIVE
PLATELET # BLD AUTO: 364 K/UL (ref 150–400)
POTASSIUM SERPL-SCNC: 3.9 MMOL/L (ref 3.5–5.1)
PROT SERPL-MCNC: 7.5 G/DL (ref 6.4–8.2)
PROTHROMBIN TIME: 11.9 SECONDS (ref 11.7–14.7)
RBC # BLD AUTO: 4.91 M/UL (ref 4.6–6.2)
SODIUM SERPL-SCNC: 141 MMOL/L (ref 136–145)
TROPONIN I SERPL DL<=0.01 NG/ML-MCNC: 19.6 PG/ML
TROPONIN I SERPL DL<=0.01 NG/ML-MCNC: 20.2 PG/ML
WBC # BLD AUTO: 6.59 K/UL (ref 4.5–11)

## 2023-10-23 PROCEDURE — 99284 EMERGENCY DEPT VISIT MOD MDM: CPT | Mod: ,,, | Performed by: NURSE PRACTITIONER

## 2023-10-23 PROCEDURE — 85730 THROMBOPLASTIN TIME PARTIAL: CPT | Performed by: NURSE PRACTITIONER

## 2023-10-23 PROCEDURE — 85379 FIBRIN DEGRADATION QUANT: CPT | Performed by: NURSE PRACTITIONER

## 2023-10-23 PROCEDURE — 85610 PROTHROMBIN TIME: CPT | Performed by: NURSE PRACTITIONER

## 2023-10-23 PROCEDURE — 25000003 PHARM REV CODE 250: Performed by: NURSE PRACTITIONER

## 2023-10-23 PROCEDURE — 80053 COMPREHEN METABOLIC PANEL: CPT | Performed by: NURSE PRACTITIONER

## 2023-10-23 PROCEDURE — 96375 TX/PRO/DX INJ NEW DRUG ADDON: CPT

## 2023-10-23 PROCEDURE — 85025 COMPLETE CBC W/AUTO DIFF WBC: CPT | Performed by: NURSE PRACTITIONER

## 2023-10-23 PROCEDURE — 83880 ASSAY OF NATRIURETIC PEPTIDE: CPT | Performed by: NURSE PRACTITIONER

## 2023-10-23 PROCEDURE — 99284 PR EMERGENCY DEPT VISIT,LEVEL IV: ICD-10-PCS | Mod: ,,, | Performed by: NURSE PRACTITIONER

## 2023-10-23 PROCEDURE — 25000242 PHARM REV CODE 250 ALT 637 W/ HCPCS: Performed by: NURSE PRACTITIONER

## 2023-10-23 PROCEDURE — 99285 EMERGENCY DEPT VISIT HI MDM: CPT | Mod: 25

## 2023-10-23 PROCEDURE — 83735 ASSAY OF MAGNESIUM: CPT | Performed by: NURSE PRACTITIONER

## 2023-10-23 PROCEDURE — 81001 URINALYSIS AUTO W/SCOPE: CPT | Performed by: NURSE PRACTITIONER

## 2023-10-23 PROCEDURE — 63600175 PHARM REV CODE 636 W HCPCS: Performed by: NURSE PRACTITIONER

## 2023-10-23 PROCEDURE — 96365 THER/PROPH/DIAG IV INF INIT: CPT

## 2023-10-23 PROCEDURE — 96374 THER/PROPH/DIAG INJ IV PUSH: CPT | Mod: 59

## 2023-10-23 PROCEDURE — 80307 DRUG TEST PRSMV CHEM ANLYZR: CPT | Performed by: NURSE PRACTITIONER

## 2023-10-23 PROCEDURE — 84484 ASSAY OF TROPONIN QUANT: CPT | Performed by: NURSE PRACTITIONER

## 2023-10-23 RX ORDER — IPRATROPIUM BROMIDE AND ALBUTEROL SULFATE 2.5; .5 MG/3ML; MG/3ML
3 SOLUTION RESPIRATORY (INHALATION)
Status: COMPLETED | OUTPATIENT
Start: 2023-10-23 | End: 2023-10-23

## 2023-10-23 RX ORDER — CHLOPHEDIANOL HCL AND PYRILAMINE MALEATE 12.5; 12.5 MG/5ML; MG/5ML
5 SOLUTION ORAL EVERY 8 HOURS PRN
Qty: 110 ML | Refills: 0 | Status: SHIPPED | OUTPATIENT
Start: 2023-10-23 | End: 2023-10-25

## 2023-10-23 RX ORDER — METHYLPREDNISOLONE 4 MG/1
TABLET ORAL
Qty: 1 EACH | Refills: 0 | Status: SHIPPED | OUTPATIENT
Start: 2023-10-23 | End: 2024-01-03

## 2023-10-23 RX ORDER — DEXAMETHASONE SODIUM PHOSPHATE 4 MG/ML
4 INJECTION, SOLUTION INTRA-ARTICULAR; INTRALESIONAL; INTRAMUSCULAR; INTRAVENOUS; SOFT TISSUE
Status: COMPLETED | OUTPATIENT
Start: 2023-10-23 | End: 2023-10-23

## 2023-10-23 RX ORDER — MORPHINE SULFATE 4 MG/ML
4 INJECTION, SOLUTION INTRAMUSCULAR; INTRAVENOUS
Status: COMPLETED | OUTPATIENT
Start: 2023-10-23 | End: 2023-10-23

## 2023-10-23 RX ORDER — CEFDINIR 300 MG/1
300 CAPSULE ORAL 2 TIMES DAILY
Qty: 20 CAPSULE | Refills: 0 | Status: SHIPPED | OUTPATIENT
Start: 2023-10-23 | End: 2023-11-02

## 2023-10-23 RX ORDER — ASPIRIN 325 MG
325 TABLET ORAL
Status: COMPLETED | OUTPATIENT
Start: 2023-10-23 | End: 2023-10-23

## 2023-10-23 RX ORDER — ONDANSETRON 2 MG/ML
4 INJECTION INTRAMUSCULAR; INTRAVENOUS
Status: COMPLETED | OUTPATIENT
Start: 2023-10-23 | End: 2023-10-23

## 2023-10-23 RX ORDER — BENZONATATE 100 MG/1
100 CAPSULE ORAL 3 TIMES DAILY PRN
Qty: 20 CAPSULE | Refills: 0 | Status: SHIPPED | OUTPATIENT
Start: 2023-10-23 | End: 2023-11-02

## 2023-10-23 RX ORDER — ALBUTEROL SULFATE 90 UG/1
1-2 AEROSOL, METERED RESPIRATORY (INHALATION) EVERY 6 HOURS PRN
Qty: 8 G | Refills: 0 | Status: SHIPPED | OUTPATIENT
Start: 2023-10-23 | End: 2024-01-03 | Stop reason: SDUPTHER

## 2023-10-23 RX ADMIN — ONDANSETRON 4 MG: 2 INJECTION INTRAMUSCULAR; INTRAVENOUS at 03:10

## 2023-10-23 RX ADMIN — MORPHINE SULFATE 4 MG: 4 INJECTION, SOLUTION INTRAMUSCULAR; INTRAVENOUS at 03:10

## 2023-10-23 RX ADMIN — DEXAMETHASONE SODIUM PHOSPHATE 4 MG: 4 INJECTION, SOLUTION INTRA-ARTICULAR; INTRALESIONAL; INTRAMUSCULAR; INTRAVENOUS; SOFT TISSUE at 04:10

## 2023-10-23 RX ADMIN — IPRATROPIUM BROMIDE AND ALBUTEROL SULFATE 3 ML: 2.5; .5 SOLUTION RESPIRATORY (INHALATION) at 04:10

## 2023-10-23 RX ADMIN — DEXTROSE MONOHYDRATE 1 G: 5 INJECTION INTRAVENOUS at 04:10

## 2023-10-23 RX ADMIN — ASPIRIN 325 MG ORAL TABLET 325 MG: 325 PILL ORAL at 04:10

## 2023-10-23 NOTE — DISCHARGE INSTRUCTIONS
Take medications as prescribed.  Follow up with primary care provider in 2 days if symptoms do not improve with treatment.  Return to the ER with new or worsening symptoms.

## 2023-10-23 NOTE — ED TRIAGE NOTES
Presents to ED via EMS from home for c/o chest pain and SOB that began this morning. Patient states he got flu shot last week in left arm and pain is now in his chest. Patient has hx of COPD, has been out of inhaler.

## 2023-10-23 NOTE — TELEPHONE ENCOUNTER
SPOKE TO THE PATIENT ABOUT HIS SHOULDER. THE PATIENT CALLED AND SAID HE WAS HURTING IN HIS SHOULDER AFTER HAVING A FLU/ MILA SHOT. WE SENT PT RX TO OUTPATIENT THERAPY- THE PATIENT STATES THAT HE IS HOMELESS AT THE PRESENT TIME. I TOLD HIM WE WOULD HAVE TO POSTPONE HIS TOTAL HIP SURGERY UNTIL HE COULD MAKE SOME ARRANGEMENTS FOR SOMEWHERE TO STAY AFTERWARD- HE WILL NEED HELP AFTER SURGERY.

## 2023-10-23 NOTE — ED PROVIDER NOTES
Encounter Date: 10/23/2023       History     Chief Complaint   Patient presents with    Chest Pain    Shortness of Breath    Cough     Patient presents to the ER via EMS with complaint of chest pain shortness of breath.  Patient reports his symptoms have been ongoing for 2-3 days.  He states his symptoms have worsened today.  He describes pain in his left chest that is sharp stabbing type pain that radiates into his left arm.  Patient denies recent illness or accident.  He reports he was scheduled for right hip surgery by Dr. Gu on October 30th.  He states he was chronic pain with his right hip.  Patient reports history of hypertension but states he was taken off all of his medications.    The history is provided by the patient and the EMS personnel. No  was used.     Review of patient's allergies indicates:   Allergen Reactions    Shellfish containing products Shortness Of Breath and Swelling    Castellani paint [phenol]     Nuts [tree nut]      Past Medical History:   Diagnosis Date    Anxiety and depression     Avascular necrosis of bone of right hip 10/4/2023    Pt has had hip pain for more than 2 years. Pt reports recent fall approximately 2 mos ago. States he was evaluated at [facility]. Instructed to follow up with ORTHO but since has not been able to do so.     COPD (chronic obstructive pulmonary disease) 9/27/2023    Coronary artery disease     GERD (gastroesophageal reflux disease)     Hyperlipidemia     Hypertension      Past Surgical History:   Procedure Laterality Date    HIP REPLACEMENT ARTHROPLASTY Right 10/30/2023    Procedure: ARTHROPLASTY, HIP REPLACEMENT;  Surgeon: Deacon Gu III, MD;  Location: [facility];  Service: Orthopedics;  Laterality: Right;    SKIN GRAFT       Family History   Problem Relation Age of Onset    Cancer Mother     Cancer Father     Cancer Maternal Aunt     Cancer Maternal Grandmother     Arthritis Paternal Grandmother      Social History      Tobacco Use    Smoking status: Every Day     Types: Cigarettes    Smokeless tobacco: Never   Substance Use Topics    Alcohol use: Yes    Drug use: Not Currently     Review of Systems   Constitutional:  Positive for activity change, appetite change and fatigue.   Respiratory:  Positive for shortness of breath.    Cardiovascular:  Positive for chest pain.   Gastrointestinal:  Positive for nausea and vomiting.   Psychiatric/Behavioral:  The patient is nervous/anxious.    All other systems reviewed and are negative.      Physical Exam     Initial Vitals [10/23/23 1449]   BP Pulse Resp Temp SpO2   (!) 142/89 (!) 111 (!) 30 98.6 °F (37 °C) 96 %      MAP       --         Physical Exam    Nursing note and vitals reviewed.  Constitutional: Vital signs are normal. He appears well-developed and well-nourished. He is cooperative. He has a sickly appearance.   HENT:   Head: Normocephalic.   Right Ear: Hearing, tympanic membrane, external ear and ear canal normal.   Left Ear: Hearing, tympanic membrane, external ear and ear canal normal.   Nose: Nose normal.   Mouth/Throat: Uvula is midline, oropharynx is clear and moist and mucous membranes are normal.   Eyes: EOM are normal.   Neck:   Normal range of motion.  Cardiovascular:  Normal rate, regular rhythm, normal heart sounds and intact distal pulses.           Pulmonary/Chest: Breath sounds normal.   Abdominal: Abdomen is soft. Bowel sounds are normal.   Musculoskeletal:         General: Normal range of motion.      Cervical back: Normal range of motion.     Neurological: He is alert and oriented to person, place, and time. He has normal strength. GCS score is 15. GCS eye subscore is 4. GCS verbal subscore is 5. GCS motor subscore is 6.   Skin: Skin is warm and dry. Capillary refill takes less than 2 seconds.   Psychiatric: He has a normal mood and affect. His behavior is normal. Judgment and thought content normal.         Medical Screening Exam   See Full Note    ED Course    Procedures  Labs Reviewed   COMPREHENSIVE METABOLIC PANEL - Abnormal; Notable for the following components:       Result Value    Alk Phos 117 (*)     All other components within normal limits   URINALYSIS, REFLEX TO URINE CULTURE - Abnormal; Notable for the following components:    Protein, UA 10 (*)     Blood, UA Small (*)     All other components within normal limits   CBC WITH DIFFERENTIAL - Abnormal; Notable for the following components:    MPV 9.3 (*)     Neutrophils % 39.3 (*)     Monocytes % 8.5 (*)     Eosinophils % 20.3 (*)     Eosinophils, Absolute 1.34 (*)     All other components within normal limits   DRUG SCREEN, URINE (BEAKER) - Abnormal; Notable for the following components:    Amphetamine, Urine Positive (*)     Cannabinoid, Urine Positive (*)     All other components within normal limits   URINALYSIS, MICROSCOPIC - Abnormal; Notable for the following components:    Squamous Epithelial Cells, UA Occasional (*)     Hyaline Casts, UA 2-5 (*)     Mucous Occasional (*)     All other components within normal limits   MAGNESIUM - Normal   PROTIME-INR - Normal   TROPONIN I - Normal   NT-PRO NATRIURETIC PEPTIDE - Normal   D DIMER, QUANTITATIVE - Normal   APTT - Normal   TROPONIN I - Normal   CBC W/ AUTO DIFFERENTIAL    Narrative:     The following orders were created for panel order CBC auto differential.  Procedure                               Abnormality         Status                     ---------                               -----------         ------                     CBC with Differential[[id number]]       Abnormal            Final result                 Please view results for these tests on the individual orders.          Imaging Results              X-Ray Chest AP Portable (Final result)  Result time 10/23/23 15:28:57      Final result by Matt Bull MD (10/23/23 15:28:57)                   Impression:      Mild left basilar atelectasis/infiltrate.  This could represent pneumonia.   Clinical correlation is requested.  Recommend radiographic follow-up to resolution      Electronically signed by: Matt Bull  Date:    10/23/2023  Time:    15:28               Narrative:    EXAMINATION:  XR CHEST AP PORTABLE    CLINICAL HISTORY:  chest pain;.    COMPARISON:  October 20, 2023    TECHNIQUE:  AP portable erect chest    FINDINGS:  Cardiac silhouette is not enlarged.  There is no mediastinal mass.  There is no pulmonary vascular engorgement.    There is some increased mild patchy and strandy parenchymal density in the left lower lung compared to the previous study.  Lungs and pleural spaces are otherwise clear.    Osseous structures are similar                                       Medications   aspirin tablet 325 mg (325 mg Oral Given 10/23/23 1600)   morphine injection 4 mg (4 mg Intravenous Given 10/23/23 1559)   ondansetron injection 4 mg (4 mg Intravenous Given 10/23/23 1559)   dexAMETHasone injection 4 mg (4 mg Intravenous Given 10/23/23 1649)   albuterol-ipratropium 2.5 mg-0.5 mg/3 mL nebulizer solution 3 mL (3 mLs Nebulization Given 10/23/23 1650)   cefTRIAXone (ROCEPHIN) 1 g in dextrose 5 % in water (D5W) 100 mL IVPB (MB+) (0 g Intravenous Stopped 10/23/23 1720)     Medical Decision Making  Patient presents to the ER via EMS with complaint of chest pain shortness of breath.  Patient reports his symptoms have been ongoing for 2-3 days.  He states his symptoms have worsened today.  He describes pain in his left chest that is sharp stabbing type pain that radiates into his left arm.  Patient denies recent illness or accident.  He reports he was scheduled for right hip surgery by Dr. Gu on October 30th.  He states he was chronic pain with his right hip.  Patient reports history of hypertension but states he was taken off all of his medications.      Amount and/or Complexity of Data Reviewed  Labs: ordered.  Radiology: ordered.  ECG/medicine tests: ordered. Decision-making details  documented in ED Course.     Details: Sinus tachycardia rate of 105bpm reviewed by Dr Nino at 1448.  Discussion of management or test interpretation with external provider(s): Aspirin 325 p.o. treat chest pain  Morphine 4 mg IV to treat chest pain  Zofran 4 mg IV to prevent nausea related to pain medication  Decadron 4 mg IV to treat pneumonia left lower lobe  DuoNeb nebulizer to be given in ER to treat pneumonia  Rocephin 1 g IV piggyback to treat pneumonia    Patient was discharged home with diagnosis of pneumonia left lower lobe and chest pain.  He was given prescription for cefdinir, albuterol inhaler, Tessalon Perles, ninja cough, and a Medrol Dosepak.  He was instructed to take medications as prescribed follow up with his primary care provider in 2 days if symptoms do not improve with treatment.    Risk  OTC drugs.  Prescription drug management.                               Clinical Impression:   Final diagnoses:  [R07.9] Chest pain  [J18.9] Pneumonia of left lower lobe due to infectious organism (Primary)        ED Disposition Condition    Discharge Stable          ED Prescriptions       Medication Sig Dispense Start Date End Date Auth. Provider    cefdinir (OMNICEF) 300 MG capsule () Take 1 capsule (300 mg total) by mouth 2 (two) times daily. for 10 days 20 capsule 10/23/2023 2023 Leonor Melendrez FNP    methylPREDNISolone (MEDROL DOSEPACK) 4 mg tablet Take as prescribed 1 each 10/23/2023 -- Leonor Melendrez, FNP    pyrilamine-chlophedianoL (NINJACOF) 12.5-12.5 mg/5 mL Liqd () Take 5 mLs by mouth every 8 (eight) hours as needed (cough). Patient not taking:  Reported on 10/25/2023 110 mL 10/23/2023 10/25/2023 Leonor Melendrez, FNP    benzonatate (TESSALON) 100 MG capsule () Take 1 capsule (100 mg total) by mouth 3 (three) times daily as needed for Cough. 20 capsule 10/23/2023 2023 Leonro Mleendrez R, FNP    albuterol (PROVENTIL/VENTOLIN HFA) 90 mcg/actuation inhaler Inhale 1-2 puffs  into the lungs every 6 (six) hours as needed for Wheezing. Rescue 8 g 10/23/2023 10/22/2024 Leonor Melendrez FNP          Follow-up Information       Follow up With Specialties Details Why Contact Info    Primary care provider  Schedule an appointment as soon as possible for a visit in 2 days If symptoms worsen              Leonor Melendrez FNP  11/28/23 0016

## 2023-10-24 ENCOUNTER — TELEPHONE (OUTPATIENT)
Dept: EMERGENCY MEDICINE | Facility: HOSPITAL | Age: 53
End: 2023-10-24
Payer: COMMERCIAL

## 2023-10-24 LAB
BILIRUB UR QL STRIP: NEGATIVE
CLARITY UR: CLEAR
COLOR UR: ABNORMAL
GLUCOSE UR STRIP-MCNC: NORMAL MG/DL
HYALINE CASTS #/AREA URNS LPF: ABNORMAL /LPF
KETONES UR STRIP-SCNC: NEGATIVE MG/DL
LEUKOCYTE ESTERASE UR QL STRIP: NEGATIVE
MUCOUS, UA: ABNORMAL /LPF
NITRITE UR QL STRIP: NEGATIVE
PH UR STRIP: 5.5 PH UNITS
PROT UR QL STRIP: 10
RBC # UR STRIP: ABNORMAL /UL
RBC #/AREA URNS HPF: 2 /HPF
SP GR UR STRIP: 1.02
SQUAMOUS #/AREA URNS LPF: ABNORMAL /HPF
UROBILINOGEN UR STRIP-ACNC: NORMAL MG/DL
WBC #/AREA URNS HPF: 1 /HPF

## 2023-10-24 NOTE — ED NOTES
Rec'vd report from TANGELA Blair.  Pt lying in bed with HOB elevated.  Pt states still coughing some but he feels much better.  Antibiotic completed and flushed iv at this time.  Pt being discharged and aware of his follow ups

## 2023-10-25 ENCOUNTER — OFFICE VISIT (OUTPATIENT)
Dept: FAMILY MEDICINE | Facility: CLINIC | Age: 53
End: 2023-10-25
Payer: COMMERCIAL

## 2023-10-25 ENCOUNTER — PATIENT MESSAGE (OUTPATIENT)
Dept: FAMILY MEDICINE | Facility: CLINIC | Age: 53
End: 2023-10-25
Payer: COMMERCIAL

## 2023-10-25 VITALS
DIASTOLIC BLOOD PRESSURE: 82 MMHG | SYSTOLIC BLOOD PRESSURE: 125 MMHG | HEART RATE: 62 BPM | TEMPERATURE: 98 F | HEIGHT: 72 IN | RESPIRATION RATE: 17 BRPM | OXYGEN SATURATION: 99 % | WEIGHT: 157 LBS | BODY MASS INDEX: 21.26 KG/M2

## 2023-10-25 DIAGNOSIS — F10.29 ALCOHOL DEPENDENCE WITH UNSPECIFIED ALCOHOL-INDUCED DISORDER: ICD-10-CM

## 2023-10-25 DIAGNOSIS — J44.9 CHRONIC OBSTRUCTIVE PULMONARY DISEASE, UNSPECIFIED COPD TYPE: ICD-10-CM

## 2023-10-25 DIAGNOSIS — M87.051 AVASCULAR NECROSIS OF BONE OF RIGHT HIP: Primary | ICD-10-CM

## 2023-10-25 DIAGNOSIS — I10 HYPERTENSION, UNSPECIFIED TYPE: ICD-10-CM

## 2023-10-25 DIAGNOSIS — M25.551 PAIN OF RIGHT HIP: ICD-10-CM

## 2023-10-25 PROCEDURE — 1159F PR MEDICATION LIST DOCUMENTED IN MEDICAL RECORD: ICD-10-PCS | Mod: CPTII,,, | Performed by: FAMILY MEDICINE

## 2023-10-25 PROCEDURE — 3074F PR MOST RECENT SYSTOLIC BLOOD PRESSURE < 130 MM HG: ICD-10-PCS | Mod: CPTII,,, | Performed by: FAMILY MEDICINE

## 2023-10-25 PROCEDURE — 3074F SYST BP LT 130 MM HG: CPT | Mod: CPTII,,, | Performed by: FAMILY MEDICINE

## 2023-10-25 PROCEDURE — 3008F PR BODY MASS INDEX (BMI) DOCUMENTED: ICD-10-PCS | Mod: CPTII,,, | Performed by: FAMILY MEDICINE

## 2023-10-25 PROCEDURE — 1159F MED LIST DOCD IN RCRD: CPT | Mod: CPTII,,, | Performed by: FAMILY MEDICINE

## 2023-10-25 PROCEDURE — 99213 PR OFFICE/OUTPT VISIT, EST, LEVL III, 20-29 MIN: ICD-10-PCS | Mod: GC,,, | Performed by: FAMILY MEDICINE

## 2023-10-25 PROCEDURE — 3044F PR MOST RECENT HEMOGLOBIN A1C LEVEL <7.0%: ICD-10-PCS | Mod: CPTII,,, | Performed by: FAMILY MEDICINE

## 2023-10-25 PROCEDURE — 3008F BODY MASS INDEX DOCD: CPT | Mod: CPTII,,, | Performed by: FAMILY MEDICINE

## 2023-10-25 PROCEDURE — 3044F HG A1C LEVEL LT 7.0%: CPT | Mod: CPTII,,, | Performed by: FAMILY MEDICINE

## 2023-10-25 PROCEDURE — 3079F DIAST BP 80-89 MM HG: CPT | Mod: CPTII,,, | Performed by: FAMILY MEDICINE

## 2023-10-25 PROCEDURE — 3079F PR MOST RECENT DIASTOLIC BLOOD PRESSURE 80-89 MM HG: ICD-10-PCS | Mod: CPTII,,, | Performed by: FAMILY MEDICINE

## 2023-10-25 PROCEDURE — 99213 OFFICE O/P EST LOW 20 MIN: CPT | Mod: GC,,, | Performed by: FAMILY MEDICINE

## 2023-10-25 NOTE — PROGRESS NOTES
Subjective:       Patient ID: Juan Mckeon is a 52 y.o. male.    Chief Complaint: surgical clearance    Pt present today for Surgery clearance, Scheduled for Right hip replacement 10/30/23. Pt had Cardiac work up in ED on 10/23/23 with Negative troponin and EKG with NSR, negative d/Dimer. Cxr revealed possible lower lobe infiltrate, Pt is currently on Omnicef with no fever /cough/chills reported today. Pt reports Alcohol use 3-5 times weekly    PMH  HTN, + smoker, + etoh, + cannabis use, + methamphetamine use              Current Outpatient Medications:     albuterol (PROVENTIL/VENTOLIN HFA) 90 mcg/actuation inhaler, Inhale 2 puffs into the lungs every 4 (four) hours as needed for Wheezing., Disp: 18 g, Rfl: 1    albuterol (PROVENTIL/VENTOLIN HFA) 90 mcg/actuation inhaler, Inhale 1-2 puffs into the lungs every 6 (six) hours as needed for Wheezing. Rescue, Disp: 8 g, Rfl: 0    benzonatate (TESSALON) 100 MG capsule, Take 1 capsule (100 mg total) by mouth 3 (three) times daily as needed for Cough., Disp: 20 capsule, Rfl: 0    calcium acetate,phosphat bind, (PHOSLO) 667 mg capsule, Take 667 mg by mouth 3 (three) times daily with meals., Disp: , Rfl:     cefdinir (OMNICEF) 300 MG capsule, Take 1 capsule (300 mg total) by mouth 2 (two) times daily. for 10 days, Disp: 20 capsule, Rfl: 0    methylPREDNISolone (MEDROL DOSEPACK) 4 mg tablet, Take as prescribed, Disp: 1 each, Rfl: 0    diclofenac sodium (VOLTAREN) 1 % Gel, Apply 2 g topically 4 (four) times daily. for 14 days, Disp: 100 g, Rfl: 0    Review of patient's allergies indicates:   Allergen Reactions    Castellani paint [phenol]     Nuts [tree nut]        Past Medical History:   Diagnosis Date    Anxiety and depression     Avascular necrosis of bone of right hip 10/4/2023    Pt has had hip pain for more than 2 years. Pt reports recent fall approximately 2 mos ago. States he was evaluated at [facility]. Instructed to follow up with ORTHO but since has not been able to do  so.     COPD (chronic obstructive pulmonary disease) 9/27/2023    Coronary artery disease     GERD (gastroesophageal reflux disease)     Hyperlipidemia     Hypertension        Past Surgical History:   Procedure Laterality Date    SKIN GRAFT         Family History   Problem Relation Age of Onset    Cancer Mother     Cancer Father     Cancer Maternal Aunt     Cancer Maternal Grandmother     Arthritis Paternal Grandmother        Social History     Tobacco Use    Smoking status: Every Day     Types: Cigarettes    Smokeless tobacco: Never   Substance Use Topics    Alcohol use: Yes    Drug use: Not Currently       Review of Systems   Constitutional:  Negative for diaphoresis, fatigue, fever and unexpected weight change.   HENT:  Negative for rhinorrhea, sinus pressure/congestion and sneezing.    Respiratory:  Negative for cough, chest tightness, shortness of breath and wheezing.    Cardiovascular:  Negative for chest pain, palpitations and leg swelling.   Gastrointestinal:  Negative for constipation, diarrhea, nausea and vomiting.   Genitourinary:  Negative for difficulty urinating.   Musculoskeletal:  Negative for back pain.   Neurological:  Negative for dizziness, weakness, light-headedness and headaches.           Objective:      Vitals:    10/25/23 0937   BP: 125/82   BP Location: Right arm   Patient Position: Sitting   BP Method: Medium (Automatic)   Pulse: 62   Resp: 17   Temp: 97.9 °F (36.6 °C)   TempSrc: Oral   SpO2: 99%   Weight: 71.2 kg (157 lb)   Height: 6' (1.829 m)     Physical Exam  Vitals reviewed.   Constitutional:       Appearance: Normal appearance.   HENT:      Head: Normocephalic and atraumatic.      Right Ear: External ear normal.      Left Ear: External ear normal.      Mouth/Throat:      Mouth: Mucous membranes are moist.      Pharynx: Oropharynx is clear.   Eyes:      Extraocular Movements: Extraocular movements intact.      Pupils: Pupils are equal, round, and reactive to light.   Cardiovascular:       Rate and Rhythm: Normal rate and regular rhythm.      Heart sounds: Normal heart sounds.   Pulmonary:      Effort: Pulmonary effort is normal.      Breath sounds: Normal breath sounds.   Abdominal:      Palpations: Abdomen is soft.   Musculoskeletal:         General: Tenderness present.      Cervical back: Neck supple.      Comments: Decreased ROM Right hip. Mild tenderness right hip lateral   Skin:     General: Skin is warm and dry.      Capillary Refill: Capillary refill takes less than 2 seconds.   Neurological:      Mental Status: He is alert and oriented to person, place, and time.   Psychiatric:         Mood and Affect: Mood normal.         Behavior: Behavior normal.         Lab Results   Component Value Date    WBC 6.59 10/23/2023    HGB 14.6 10/23/2023    HCT 41.2 10/23/2023     10/23/2023    CHOL 209 (H) 09/27/2023    TRIG 117 09/27/2023    HDL 75 (H) 09/27/2023    ALT 16 10/23/2023    AST 16 10/23/2023     10/23/2023    K 3.9 10/23/2023     10/23/2023    CREATININE 1.15 10/23/2023    BUN 7 10/23/2023    CO2 25 10/23/2023    TSH 0.923 07/12/2022    INR 0.88 10/23/2023    HGBA1C 4.9 09/27/2023      Assessment:       1. Avascular necrosis of bone of right hip    2. Pain of right hip    3. Chronic obstructive pulmonary disease, unspecified COPD type    4. Hypertension, unspecified type    5. Alcohol dependence with unspecified alcohol-induced disorder        Plan:         Problem List Items Addressed This Visit          Orthopedic    Avascular necrosis of bone of right hip - Primary     RCRI score 0 Class 1 risk  Surgery scheduled on 10/30/23 R Hip replacement  Based on todays Exam and recent labs, EKG, CXR I see no definitve contraindication to planned surgical procedure on 10/30/23.          Other Visit Diagnoses       Pain of right hip        Chronic obstructive pulmonary disease, unspecified COPD type        Hypertension, unspecified type        Alcohol dependence with unspecified  alcohol-induced disorder                  Follow up in about 2 months (around 12/25/2023).    Molly Posey MD

## 2023-10-25 NOTE — ASSESSMENT & PLAN NOTE
RCRI score 0 Class 1 risk  Surgery scheduled on 10/30/23 R Hip replacement  Based on todays Exam and recent labs, EKG, CXR I see no definitve contraindication to planned surgical procedure on 10/30/23.

## 2023-10-27 ENCOUNTER — PATIENT MESSAGE (OUTPATIENT)
Dept: FAMILY MEDICINE | Facility: CLINIC | Age: 53
End: 2023-10-27
Payer: COMMERCIAL

## 2023-10-30 ENCOUNTER — ANESTHESIA EVENT (OUTPATIENT)
Dept: SURGERY | Facility: HOSPITAL | Age: 53
End: 2023-10-30
Payer: COMMERCIAL

## 2023-10-30 ENCOUNTER — HOSPITAL ENCOUNTER (OUTPATIENT)
Facility: HOSPITAL | Age: 53
Discharge: HOME | End: 2023-10-31
Attending: ORTHOPAEDIC SURGERY | Admitting: ORTHOPAEDIC SURGERY
Payer: COMMERCIAL

## 2023-10-30 ENCOUNTER — ANESTHESIA (OUTPATIENT)
Dept: SURGERY | Facility: HOSPITAL | Age: 53
End: 2023-10-30
Payer: COMMERCIAL

## 2023-10-30 DIAGNOSIS — M87.051 AVASCULAR NECROSIS OF BONE OF RIGHT HIP: Primary | ICD-10-CM

## 2023-10-30 DIAGNOSIS — M16.9 DEGENERATIVE JOINT DISEASE (DJD) OF HIP: ICD-10-CM

## 2023-10-30 PROBLEM — I10 PRIMARY HYPERTENSION: Status: ACTIVE | Noted: 2023-10-30

## 2023-10-30 LAB
ANION GAP SERPL CALCULATED.3IONS-SCNC: 10 MMOL/L (ref 7–16)
BASOPHILS # BLD AUTO: 0.02 K/UL (ref 0–0.2)
BASOPHILS NFR BLD AUTO: 0.2 % (ref 0–1)
BUN SERPL-MCNC: 12 MG/DL (ref 7–18)
BUN/CREAT SERPL: 14 (ref 6–20)
CALCIUM SERPL-MCNC: 8.3 MG/DL (ref 8.5–10.1)
CHLORIDE SERPL-SCNC: 107 MMOL/L (ref 98–107)
CO2 SERPL-SCNC: 28 MMOL/L (ref 21–32)
CREAT SERPL-MCNC: 0.84 MG/DL (ref 0.7–1.3)
DIFFERENTIAL METHOD BLD: ABNORMAL
EGFR (NO RACE VARIABLE) (RUSH/TITUS): 105 ML/MIN/1.73M2
EOSINOPHIL # BLD AUTO: 0.21 K/UL (ref 0–0.5)
EOSINOPHIL NFR BLD AUTO: 2.1 % (ref 1–4)
ERYTHROCYTE [DISTWIDTH] IN BLOOD BY AUTOMATED COUNT: 13.4 % (ref 11.5–14.5)
GLUCOSE SERPL-MCNC: 105 MG/DL (ref 74–106)
HCT VFR BLD AUTO: 37.9 % (ref 40–54)
HGB BLD-MCNC: 12.8 G/DL (ref 13.5–18)
IMM GRANULOCYTES # BLD AUTO: 0.06 K/UL (ref 0–0.04)
IMM GRANULOCYTES NFR BLD: 0.6 % (ref 0–0.4)
LYMPHOCYTES # BLD AUTO: 1.14 K/UL (ref 1–4.8)
LYMPHOCYTES NFR BLD AUTO: 11.4 % (ref 27–41)
MCH RBC QN AUTO: 29.7 PG (ref 27–31)
MCHC RBC AUTO-ENTMCNC: 33.8 G/DL (ref 32–36)
MCV RBC AUTO: 87.9 FL (ref 80–96)
MONOCYTES # BLD AUTO: 0.32 K/UL (ref 0–0.8)
MONOCYTES NFR BLD AUTO: 3.2 % (ref 2–6)
MPC BLD CALC-MCNC: 9.6 FL (ref 9.4–12.4)
NEUTROPHILS # BLD AUTO: 8.21 K/UL (ref 1.8–7.7)
NEUTROPHILS NFR BLD AUTO: 82.5 % (ref 53–65)
NRBC # BLD AUTO: 0 X10E3/UL
NRBC, AUTO (.00): 0 %
PLATELET # BLD AUTO: 298 K/UL (ref 150–400)
POTASSIUM SERPL-SCNC: 4.1 MMOL/L (ref 3.5–5.1)
RBC # BLD AUTO: 4.31 M/UL (ref 4.6–6.2)
SODIUM SERPL-SCNC: 141 MMOL/L (ref 136–145)
WBC # BLD AUTO: 9.96 K/UL (ref 4.5–11)

## 2023-10-30 PROCEDURE — 85025 COMPLETE CBC W/AUTO DIFF WBC: CPT | Performed by: ORTHOPAEDIC SURGERY

## 2023-10-30 PROCEDURE — 27130 PR TOTAL HIP ARTHROPLASTY: ICD-10-PCS | Mod: RT,,, | Performed by: ORTHOPAEDIC SURGERY

## 2023-10-30 PROCEDURE — 37000009 HC ANESTHESIA EA ADD 15 MINS: Performed by: ORTHOPAEDIC SURGERY

## 2023-10-30 PROCEDURE — 99214 OFFICE O/P EST MOD 30 MIN: CPT | Mod: ,,, | Performed by: HOSPITALIST

## 2023-10-30 PROCEDURE — 64450 PERIPHERAL BLOCK: ICD-10-PCS | Mod: 59,RT,, | Performed by: ANESTHESIOLOGY

## 2023-10-30 PROCEDURE — C1776 JOINT DEVICE (IMPLANTABLE): HCPCS | Performed by: ORTHOPAEDIC SURGERY

## 2023-10-30 PROCEDURE — 88304 TISSUE EXAM BY PATHOLOGIST: CPT | Mod: TC,SUR | Performed by: ORTHOPAEDIC SURGERY

## 2023-10-30 PROCEDURE — 27130 TOTAL HIP ARTHROPLASTY: CPT | Mod: RT,,, | Performed by: ORTHOPAEDIC SURGERY

## 2023-10-30 PROCEDURE — 88311 DECALCIFY TISSUE: CPT | Mod: TC,SUR | Performed by: ORTHOPAEDIC SURGERY

## 2023-10-30 PROCEDURE — 36000710: Performed by: ORTHOPAEDIC SURGERY

## 2023-10-30 PROCEDURE — 71000015 HC POSTOP RECOV 1ST HR: Performed by: ORTHOPAEDIC SURGERY

## 2023-10-30 PROCEDURE — 80048 BASIC METABOLIC PNL TOTAL CA: CPT | Performed by: ORTHOPAEDIC SURGERY

## 2023-10-30 PROCEDURE — 27000510 HC BLANKET BAIR HUGGER ANY SIZE: Performed by: NURSE ANESTHETIST, CERTIFIED REGISTERED

## 2023-10-30 PROCEDURE — 94761 N-INVAS EAR/PLS OXIMETRY MLT: CPT

## 2023-10-30 PROCEDURE — 88304 SURGICAL PATHOLOGY: ICD-10-PCS | Mod: 26,,, | Performed by: PATHOLOGY

## 2023-10-30 PROCEDURE — 71000033 HC RECOVERY, INTIAL HOUR: Performed by: ORTHOPAEDIC SURGERY

## 2023-10-30 PROCEDURE — D9220A PRA ANESTHESIA: ICD-10-PCS | Mod: CRNA,,, | Performed by: NURSE ANESTHETIST, CERTIFIED REGISTERED

## 2023-10-30 PROCEDURE — D9220A PRA ANESTHESIA: Mod: ANES,,, | Performed by: ANESTHESIOLOGY

## 2023-10-30 PROCEDURE — 37000008 HC ANESTHESIA 1ST 15 MINUTES: Performed by: ORTHOPAEDIC SURGERY

## 2023-10-30 PROCEDURE — 25000003 PHARM REV CODE 250: Performed by: ORTHOPAEDIC SURGERY

## 2023-10-30 PROCEDURE — 27200750 HC INSULATED NEEDLE/ STIMUPLEX: Performed by: NURSE ANESTHETIST, CERTIFIED REGISTERED

## 2023-10-30 PROCEDURE — D9220A PRA ANESTHESIA: Mod: CRNA,,, | Performed by: NURSE ANESTHETIST, CERTIFIED REGISTERED

## 2023-10-30 PROCEDURE — 63600175 PHARM REV CODE 636 W HCPCS: Performed by: NURSE ANESTHETIST, CERTIFIED REGISTERED

## 2023-10-30 PROCEDURE — 97162 PT EVAL MOD COMPLEX 30 MIN: CPT

## 2023-10-30 PROCEDURE — 27000177 HC AIRWAY, LARYNGEAL MASK: Performed by: NURSE ANESTHETIST, CERTIFIED REGISTERED

## 2023-10-30 PROCEDURE — 27000716 HC OXISENSOR PROBE, ANY SIZE: Performed by: NURSE ANESTHETIST, CERTIFIED REGISTERED

## 2023-10-30 PROCEDURE — 64450 NJX AA&/STRD OTHER PN/BRANCH: CPT | Mod: 59,RT,, | Performed by: ANESTHESIOLOGY

## 2023-10-30 PROCEDURE — 88311 SURGICAL PATHOLOGY: ICD-10-PCS | Mod: 26,,, | Performed by: PATHOLOGY

## 2023-10-30 PROCEDURE — 36000711: Performed by: ORTHOPAEDIC SURGERY

## 2023-10-30 PROCEDURE — 63600175 PHARM REV CODE 636 W HCPCS: Performed by: ANESTHESIOLOGY

## 2023-10-30 PROCEDURE — 71000016 HC POSTOP RECOV ADDL HR: Performed by: ORTHOPAEDIC SURGERY

## 2023-10-30 PROCEDURE — 27201423 OPTIME MED/SURG SUP & DEVICES STERILE SUPPLY: Performed by: ORTHOPAEDIC SURGERY

## 2023-10-30 PROCEDURE — 99214 PR OFFICE/OUTPT VISIT, EST, LEVL IV, 30-39 MIN: ICD-10-PCS | Mod: ,,, | Performed by: HOSPITALIST

## 2023-10-30 PROCEDURE — 99900035 HC TECH TIME PER 15 MIN (STAT)

## 2023-10-30 PROCEDURE — 88304 TISSUE EXAM BY PATHOLOGIST: CPT | Mod: 26,,, | Performed by: PATHOLOGY

## 2023-10-30 PROCEDURE — D9220A PRA ANESTHESIA: ICD-10-PCS | Mod: ANES,,, | Performed by: ANESTHESIOLOGY

## 2023-10-30 PROCEDURE — 63600175 PHARM REV CODE 636 W HCPCS: Performed by: ORTHOPAEDIC SURGERY

## 2023-10-30 PROCEDURE — 88311 DECALCIFY TISSUE: CPT | Mod: 26,,, | Performed by: PATHOLOGY

## 2023-10-30 PROCEDURE — 27201960 HC SPINAL TRAY: Performed by: NURSE ANESTHETIST, CERTIFIED REGISTERED

## 2023-10-30 PROCEDURE — 25000003 PHARM REV CODE 250: Performed by: NURSE ANESTHETIST, CERTIFIED REGISTERED

## 2023-10-30 DEVICE — IMPLANTABLE DEVICE
Type: IMPLANTABLE DEVICE | Site: HIP | Status: FUNCTIONAL
Brand: G7® DUAL MOBILITY ACETABULAR SYSTEM

## 2023-10-30 DEVICE — IMPLANTABLE DEVICE
Type: IMPLANTABLE DEVICE | Site: HIP | Status: FUNCTIONAL
Brand: G7® ACETABULAR SYSTEM

## 2023-10-30 DEVICE — IMPLANTABLE DEVICE
Type: IMPLANTABLE DEVICE | Site: HIP | Status: FUNCTIONAL
Brand: TAPERLOC COMPLETE PRIMARY FEMORAL

## 2023-10-30 RX ORDER — MORPHINE SULFATE 10 MG/ML
4 INJECTION INTRAMUSCULAR; INTRAVENOUS; SUBCUTANEOUS EVERY 5 MIN PRN
Status: DISCONTINUED | OUTPATIENT
Start: 2023-10-30 | End: 2023-10-30 | Stop reason: HOSPADM

## 2023-10-30 RX ORDER — FENTANYL CITRATE 50 UG/ML
INJECTION, SOLUTION INTRAMUSCULAR; INTRAVENOUS
Status: DISCONTINUED | OUTPATIENT
Start: 2023-10-30 | End: 2023-10-30

## 2023-10-30 RX ORDER — ONDANSETRON 2 MG/ML
INJECTION INTRAMUSCULAR; INTRAVENOUS
Status: DISCONTINUED | OUTPATIENT
Start: 2023-10-30 | End: 2023-10-30

## 2023-10-30 RX ORDER — TRANEXAMIC ACID 100 MG/ML
INJECTION, SOLUTION INTRAVENOUS
Status: DISCONTINUED | OUTPATIENT
Start: 2023-10-30 | End: 2023-10-30

## 2023-10-30 RX ORDER — CEFAZOLIN SODIUM 1 G/3ML
INJECTION, POWDER, FOR SOLUTION INTRAMUSCULAR; INTRAVENOUS
Status: DISCONTINUED | OUTPATIENT
Start: 2023-10-30 | End: 2023-10-30

## 2023-10-30 RX ORDER — LIDOCAINE HYDROCHLORIDE 20 MG/ML
INJECTION, SOLUTION EPIDURAL; INFILTRATION; INTRACAUDAL; PERINEURAL
Status: DISCONTINUED | OUTPATIENT
Start: 2023-10-30 | End: 2023-10-30

## 2023-10-30 RX ORDER — BISACODYL 10 MG
10 SUPPOSITORY, RECTAL RECTAL DAILY PRN
Status: DISCONTINUED | OUTPATIENT
Start: 2023-10-30 | End: 2023-10-31 | Stop reason: HOSPADM

## 2023-10-30 RX ORDER — DIPHENHYDRAMINE HYDROCHLORIDE 50 MG/ML
INJECTION INTRAMUSCULAR; INTRAVENOUS
Status: DISCONTINUED | OUTPATIENT
Start: 2023-10-30 | End: 2023-10-30

## 2023-10-30 RX ORDER — OXYCODONE AND ACETAMINOPHEN 10; 325 MG/1; MG/1
1 TABLET ORAL EVERY 6 HOURS PRN
Status: DISCONTINUED | OUTPATIENT
Start: 2023-10-30 | End: 2023-10-31 | Stop reason: HOSPADM

## 2023-10-30 RX ORDER — MORPHINE SULFATE 4 MG/ML
4 INJECTION, SOLUTION INTRAMUSCULAR; INTRAVENOUS EVERY 4 HOURS PRN
Status: DISCONTINUED | OUTPATIENT
Start: 2023-10-30 | End: 2023-10-31

## 2023-10-30 RX ORDER — EPHEDRINE SULFATE 50 MG/ML
INJECTION, SOLUTION INTRAVENOUS
Status: DISCONTINUED | OUTPATIENT
Start: 2023-10-30 | End: 2023-10-30

## 2023-10-30 RX ORDER — PHENYLEPHRINE HYDROCHLORIDE 10 MG/ML
INJECTION INTRAVENOUS
Status: DISCONTINUED | OUTPATIENT
Start: 2023-10-30 | End: 2023-10-30

## 2023-10-30 RX ORDER — ROPIVACAINE HYDROCHLORIDE 5 MG/ML
INJECTION, SOLUTION EPIDURAL; INFILTRATION; PERINEURAL
Status: COMPLETED | OUTPATIENT
Start: 2023-10-30 | End: 2023-10-30

## 2023-10-30 RX ORDER — SODIUM CHLORIDE, SODIUM LACTATE, POTASSIUM CHLORIDE, CALCIUM CHLORIDE 600; 310; 30; 20 MG/100ML; MG/100ML; MG/100ML; MG/100ML
INJECTION, SOLUTION INTRAVENOUS CONTINUOUS
Status: DISCONTINUED | OUTPATIENT
Start: 2023-10-30 | End: 2023-10-31 | Stop reason: HOSPADM

## 2023-10-30 RX ORDER — HYDROMORPHONE HYDROCHLORIDE 2 MG/ML
0.5 INJECTION, SOLUTION INTRAMUSCULAR; INTRAVENOUS; SUBCUTANEOUS EVERY 5 MIN PRN
Status: DISCONTINUED | OUTPATIENT
Start: 2023-10-30 | End: 2023-10-30 | Stop reason: HOSPADM

## 2023-10-30 RX ORDER — ALBUTEROL SULFATE 90 UG/1
2 AEROSOL, METERED RESPIRATORY (INHALATION) EVERY 6 HOURS PRN
Status: DISCONTINUED | OUTPATIENT
Start: 2023-10-30 | End: 2023-10-30 | Stop reason: RX

## 2023-10-30 RX ORDER — MIDAZOLAM HYDROCHLORIDE 1 MG/ML
INJECTION INTRAMUSCULAR; INTRAVENOUS
Status: DISCONTINUED | OUTPATIENT
Start: 2023-10-30 | End: 2023-10-30

## 2023-10-30 RX ORDER — DEXAMETHASONE SODIUM PHOSPHATE 4 MG/ML
INJECTION, SOLUTION INTRA-ARTICULAR; INTRALESIONAL; INTRAMUSCULAR; INTRAVENOUS; SOFT TISSUE
Status: DISCONTINUED | OUTPATIENT
Start: 2023-10-30 | End: 2023-10-30

## 2023-10-30 RX ORDER — OXYCODONE AND ACETAMINOPHEN 7.5; 325 MG/1; MG/1
1 TABLET ORAL EVERY 6 HOURS PRN
Qty: 20 TABLET | Refills: 0 | Status: SHIPPED | OUTPATIENT
Start: 2023-10-30 | End: 2023-11-10 | Stop reason: ALTCHOICE

## 2023-10-30 RX ORDER — DEXAMETHASONE SODIUM PHOSPHATE 10 MG/ML
INJECTION INTRAMUSCULAR; INTRAVENOUS
Status: COMPLETED | OUTPATIENT
Start: 2023-10-30 | End: 2023-10-30

## 2023-10-30 RX ORDER — MORPHINE SULFATE 10 MG/ML
4 INJECTION INTRAMUSCULAR; INTRAVENOUS; SUBCUTANEOUS EVERY 4 HOURS PRN
Status: DISCONTINUED | OUTPATIENT
Start: 2023-10-30 | End: 2023-10-30

## 2023-10-30 RX ORDER — PROPOFOL 10 MG/ML
VIAL (ML) INTRAVENOUS
Status: DISCONTINUED | OUTPATIENT
Start: 2023-10-30 | End: 2023-10-30

## 2023-10-30 RX ORDER — ALBUTEROL SULFATE 0.83 MG/ML
2.5 SOLUTION RESPIRATORY (INHALATION) EVERY 6 HOURS PRN
Status: DISCONTINUED | OUTPATIENT
Start: 2023-10-30 | End: 2023-10-31 | Stop reason: HOSPADM

## 2023-10-30 RX ORDER — ONDANSETRON 2 MG/ML
4 INJECTION INTRAMUSCULAR; INTRAVENOUS DAILY PRN
Status: DISCONTINUED | OUTPATIENT
Start: 2023-10-30 | End: 2023-10-30 | Stop reason: HOSPADM

## 2023-10-30 RX ORDER — MEPERIDINE HYDROCHLORIDE 25 MG/ML
25 INJECTION INTRAMUSCULAR; INTRAVENOUS; SUBCUTANEOUS ONCE AS NEEDED
Status: DISCONTINUED | OUTPATIENT
Start: 2023-10-30 | End: 2023-10-30 | Stop reason: HOSPADM

## 2023-10-30 RX ORDER — SODIUM CHLORIDE 9 MG/ML
INJECTION, SOLUTION INTRAVENOUS CONTINUOUS
Status: DISCONTINUED | OUTPATIENT
Start: 2023-10-30 | End: 2023-10-30

## 2023-10-30 RX ORDER — ONDANSETRON 2 MG/ML
4 INJECTION INTRAMUSCULAR; INTRAVENOUS EVERY 8 HOURS PRN
Status: DISCONTINUED | OUTPATIENT
Start: 2023-10-30 | End: 2023-10-31 | Stop reason: HOSPADM

## 2023-10-30 RX ORDER — BUPIVACAINE HYDROCHLORIDE 7.5 MG/ML
INJECTION, SOLUTION EPIDURAL; RETROBULBAR
Status: COMPLETED | OUTPATIENT
Start: 2023-10-30 | End: 2023-10-30

## 2023-10-30 RX ORDER — DIPHENHYDRAMINE HYDROCHLORIDE 50 MG/ML
25 INJECTION INTRAMUSCULAR; INTRAVENOUS EVERY 6 HOURS PRN
Status: DISCONTINUED | OUTPATIENT
Start: 2023-10-30 | End: 2023-10-30 | Stop reason: HOSPADM

## 2023-10-30 RX ADMIN — CEFAZOLIN 2 G: 2 INJECTION, POWDER, FOR SOLUTION INTRAMUSCULAR; INTRAVENOUS at 04:10

## 2023-10-30 RX ADMIN — EPHEDRINE SULFATE 50 MG: 50 INJECTION INTRAVENOUS at 08:10

## 2023-10-30 RX ADMIN — PHENYLEPHRINE HYDROCHLORIDE 200 MCG: 10 INJECTION INTRAVENOUS at 09:10

## 2023-10-30 RX ADMIN — LIDOCAINE HYDROCHLORIDE 75 MG: 20 INJECTION, SOLUTION INTRAVENOUS at 08:10

## 2023-10-30 RX ADMIN — ONDANSETRON 4 MG: 2 INJECTION INTRAMUSCULAR; INTRAVENOUS at 07:10

## 2023-10-30 RX ADMIN — VANCOMYCIN HYDROCHLORIDE 1250 MG: 1 INJECTION, POWDER, LYOPHILIZED, FOR SOLUTION INTRAVENOUS at 08:10

## 2023-10-30 RX ADMIN — FENTANYL CITRATE 100 MCG: 50 INJECTION INTRAMUSCULAR; INTRAVENOUS at 08:10

## 2023-10-30 RX ADMIN — OXYCODONE HYDROCHLORIDE AND ACETAMINOPHEN 1 TABLET: 10; 325 TABLET ORAL at 11:10

## 2023-10-30 RX ADMIN — CEFAZOLIN 2 G: 1 INJECTION, POWDER, FOR SOLUTION INTRAMUSCULAR; INTRAVENOUS; PARENTERAL at 08:10

## 2023-10-30 RX ADMIN — ONDANSETRON 4 MG: 2 INJECTION INTRAMUSCULAR; INTRAVENOUS at 08:10

## 2023-10-30 RX ADMIN — DEXAMETHASONE SODIUM PHOSPHATE 10 MG: 10 INJECTION, SOLUTION INTRAMUSCULAR; INTRAVENOUS at 08:10

## 2023-10-30 RX ADMIN — BUPIVACAINE HYDROCHLORIDE 1.5 ML: 7.5 INJECTION, SOLUTION EPIDURAL; RETROBULBAR at 08:10

## 2023-10-30 RX ADMIN — MIDAZOLAM HYDROCHLORIDE 2 MG: 1 INJECTION, SOLUTION INTRAMUSCULAR; INTRAVENOUS at 07:10

## 2023-10-30 RX ADMIN — ROPIVACAINE HYDROCHLORIDE 20 ML: 5 INJECTION, SOLUTION EPIDURAL; INFILTRATION; PERINEURAL at 08:10

## 2023-10-30 RX ADMIN — PHENYLEPHRINE HYDROCHLORIDE 100 MCG: 10 INJECTION INTRAVENOUS at 10:10

## 2023-10-30 RX ADMIN — TRANEXAMIC ACID 1000 MG: 100 INJECTION, SOLUTION INTRAVENOUS at 08:10

## 2023-10-30 RX ADMIN — VANCOMYCIN HYDROCHLORIDE 1000 MG: 1 INJECTION, POWDER, LYOPHILIZED, FOR SOLUTION INTRAVENOUS at 09:10

## 2023-10-30 RX ADMIN — PROPOFOL 140 MG: 10 INJECTION, EMULSION INTRAVENOUS at 08:10

## 2023-10-30 RX ADMIN — DEXAMETHASONE SODIUM PHOSPHATE 10 MG: 4 INJECTION, SOLUTION INTRA-ARTICULAR; INTRALESIONAL; INTRAMUSCULAR; INTRAVENOUS; SOFT TISSUE at 08:10

## 2023-10-30 RX ADMIN — MORPHINE SULFATE 4 MG: 4 INJECTION, SOLUTION INTRAMUSCULAR; INTRAVENOUS at 04:10

## 2023-10-30 RX ADMIN — OXYCODONE HYDROCHLORIDE AND ACETAMINOPHEN 1 TABLET: 10; 325 TABLET ORAL at 07:10

## 2023-10-30 RX ADMIN — DIPHENHYDRAMINE HYDROCHLORIDE 50 MG: 50 INJECTION, SOLUTION INTRAMUSCULAR; INTRAVENOUS at 07:10

## 2023-10-30 RX ADMIN — PHENYLEPHRINE HYDROCHLORIDE 100 MCG: 10 INJECTION INTRAVENOUS at 09:10

## 2023-10-30 RX ADMIN — SODIUM CHLORIDE: 9 INJECTION, SOLUTION INTRAVENOUS at 06:10

## 2023-10-30 RX ADMIN — TRANEXAMIC ACID 1000 MG: 100 INJECTION, SOLUTION INTRAVENOUS at 09:10
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
## 2023-10-30 NOTE — ANESTHESIA PREPROCEDURE EVALUATION
"                                                                                                             10/30/2023  Juan Mckeon is a 52 y.o., male.      Pre-op Assessment    I have reviewed the Patient Summary Reports.     I have reviewed the Nursing Notes. I have reviewed the NPO Status.   I have reviewed the Medications.     Review of Systems  Anesthesia Hx:  No problems with previous Anesthesia  Denies Family Hx of Anesthesia complications.   Denies Personal Hx of Anesthesia complications.   Social:  Smoker, Alcohol Use, Recreational Drugs + THC, + methamphetamines   Cardiovascular:   Hypertension CAD   ECG has been reviewed. Prior cardiac workup in ED with negative cardiac troponins and normal EKG    PCP "surgical clearance" reviewed    RCRI - 0 points   Pulmonary:   COPD, mild    Hepatic/GI:   GERD    Musculoskeletal:   Arthritis  AVN right hip   Psych:   Psychiatric History anxiety depression          Physical Exam  General: Well nourished, Cooperative and Alert    Airway:  Mallampati: II   Mouth Opening: Normal  TM Distance: Normal  Tongue: Normal  Neck ROM: Normal ROM    Chest/Lungs:  Clear to auscultation, Normal Respiratory Rate    Heart:  Rate: Normal  Rhythm: Regular Rhythm        Chemistry        Component Value Date/Time     10/23/2023 1515    K 3.9 10/23/2023 1515     10/23/2023 1515    CO2 25 10/23/2023 1515    BUN 7 10/23/2023 1515    CREATININE 1.15 10/23/2023 1515    GLU 89 10/23/2023 1515        Component Value Date/Time    CALCIUM 9.4 10/23/2023 1515    ALKPHOS 117 (H) 10/23/2023 1515    AST 16 10/23/2023 1515    ALT 16 10/23/2023 1515    BILITOT 0.4 10/23/2023 1515    EGFRNONAA 78 07/12/2022 1400        Lab Results   Component Value Date    WBC 6.59 10/23/2023    HGB 14.6 10/23/2023    HCT 41.2 10/23/2023     10/23/2023     Results for orders placed or performed in visit on 10/20/23   EKG 12-lead    Collection Time: 10/20/23 12:38 PM    Narrative    Test Reason : " Z01.810,    Vent. Rate : 081 BPM     Atrial Rate : 081 BPM     P-R Int : 136 ms          QRS Dur : 084 ms      QT Int : 372 ms       P-R-T Axes : 079 081 072 degrees     QTc Int : 432 ms    Normal sinus rhythm  Right atrial enlargement  Borderline Abnormal ECG  No previous ECGs available  Confirmed by Johnnie Bliss DO ([id number]) on 10/24/2023 10:51:20 PM    Referred By: JOANNA MARSHALL           Confirmed By:Johnnie Bliss DO         Anesthesia Plan  Type of Anesthesia, risks & benefits discussed:    Anesthesia Type: Gen ETT, Regional, Spinal  Intra-op Monitoring Plan: Standard ASA Monitors  Post Op Pain Control Plan: multimodal analgesia  Induction:  IV  Airway Plan: Direct, Post-Induction  Informed Consent: Informed consent signed with the Patient and all parties understand the risks and agree with anesthesia plan.  All questions answered.   ASA Score: 3  Day of Surgery Review of History & Physical: H&P Update referred to the surgeon/provider.I have interviewed and examined the patient. I have reviewed the patient's H&P dated: There are no significant changes.     Ready For Surgery From Anesthesia Perspective.     .

## 2023-10-30 NOTE — TRANSFER OF CARE
"Anesthesia Transfer of Care Note    Patient: Juan Mckeon    Procedure(s) Performed: Procedure(s) (LRB):  ARTHROPLASTY, HIP REPLACEMENT (Right)    Patient location: PACU    Anesthesia Type: general    Transport from OR: Transported from OR on room air with adequate spontaneous ventilation    Post pain: adequate analgesia    Post assessment: no apparent anesthetic complications    Post vital signs: stable    Level of consciousness: sedated    Nausea/Vomiting: no nausea/vomiting    Complications: none    Transfer of care protocol was followed      Last vitals:   Visit Vitals  BP (!) 99/52   Pulse 63   Temp 36.5 °C (97.7 °F)   Resp 15   Ht 5' 10" (1.778 m)   Wt 71.2 kg (157 lb)   SpO2 97%   BMI 22.53 kg/m²     "

## 2023-10-30 NOTE — ANESTHESIA PROCEDURE NOTES
Spinal    Diagnosis: right wilson  Patient location during procedure: OR  Start time: 10/30/2023 8:06 AM  Timeout: 10/30/2023 8:05 AM  End time: 10/30/2023 8:10 AM    Staffing  Authorizing Provider: Neftali Porras DO  Performing Provider: Neftali Porras DO    Staffing  Performed by: Neftali Porras DO  Authorized by: Neftali Porras DO    Preanesthetic Checklist  Completed: patient identified, IV checked, site marked, risks and benefits discussed, surgical consent, monitors and equipment checked, pre-op evaluation and timeout performed  Spinal Block  Patient position: sitting  Prep: ChloraPrep  Patient monitoring: heart rate, continuous pulse ox and frequent blood pressure checks  Approach: midline  Location: L4-5  Injection technique: single shot  CSF Fluid: clear free-flowing CSF  Needle  Needle type: Quincke   Needle gauge: 22 G  Needle length: 3.5 in  Additional Documentation: incremental injection, negative aspiration for heme and no paresthesia on injection  Needle localization: anatomical landmarks  Assessment  Ease of block: moderate  Patient's tolerance of the procedure: comfortable throughout block  Medications:    Medications: BUPivacaine (pf) (MARCAINE) injection 0.75% - Intraspinal   1.5 mL - 10/30/2023 8:10:00 AM

## 2023-10-30 NOTE — PROGRESS NOTES
Orthopedic  Postop right total hip  Vital signs are stable  Alert oriented x4  Drain in place  Dressings dry  Thigh and calf were soft  Neuro intact  Patient doing well plan will be to go home tomorrow at physical therapy works with patient    X-rays show good alignment

## 2023-10-30 NOTE — ANESTHESIA POSTPROCEDURE EVALUATION
Anesthesia Post Evaluation    Patient: Juan Mckeon    Procedure(s) Performed: Procedure(s) (LRB):  ARTHROPLASTY, HIP REPLACEMENT (Right)    Final Anesthesia Type: general      Patient location during evaluation: PACU  Patient participation: Yes- Able to Participate  Level of consciousness: awake and alert and oriented  Post-procedure vital signs: reviewed and stable  Pain management: adequate  Airway patency: patent  GAY mitigation strategies: Multimodal analgesia and Use of major conduction anesthesia (spinal/epidural) or peripheral nerve block  PONV status at discharge: No PONV  Anesthetic complications: no      Cardiovascular status: hemodynamically stable  Respiratory status: unassisted and spontaneous ventilation  Hydration status: euvolemic  Follow-up not needed.          Vitals Value Taken Time   /65 10/30/23 1131   Temp 36.5 °C (97.7 °F) 10/30/23 1034   Pulse 73 10/30/23 1146   Resp 18 10/30/23 1100   SpO2 98 % 10/30/23 1146   Vitals shown include unvalidated device data.      Event Time   Out of Recovery 11:01:18         Pain/Eliseo Score: Eliseo Score: 9 (10/30/2023 11:05 AM)

## 2023-10-30 NOTE — PLAN OF CARE
[facility] - Orthopedic Periop Services  Initial Discharge Assessment       Primary Care Provider: No, Primary Doctor    Admission Diagnosis: Avascular necrosis of bone of right hip [M87.051]  Degenerative joint disease (DJD) of hip [M16.9]    Admission Date: 10/30/2023  Expected Discharge Date: 10/31/2023    Transition of Care Barriers: Underinsured    Payor: [employer] [facility] / Plan: [employer] [facility] [employer] [place] / Product Type: Commercial /     Extended Emergency Contact Information  Primary Emergency Contact: Uche López  Mobile Phone: [phone]  Relation: Brother  Preferred language: English   needed? No  Secondary Emergency Contact: Petra Lyons  Mobile Phone: [phone]  Relation: Significant other  Preferred language: English   needed? No    Discharge Plan A: Home with family, Home Health  Discharge Plan B: Home with family, Home Health      [facility] [address] [place] [address] [address] [facility] [address]  [address]  [facility] [address]  Phone: [phone] Fax: [phone]    [facility] [place] - [address]  [address]  [facility] [address]  Phone: [phone] Fax: [phone]      Initial Assessment (most recent)       Adult Discharge Assessment - 10/30/23 1518          Discharge Assessment    Assessment Type Discharge Planning Assessment     Source of Information patient     If unable to respond/provide information was family/caregiver contacted? Yes     Contact Name/Number Petra Eloy (rose) [phone]     Communicated SRIDEVI with patient/caregiver Yes     Reason For Admission Avascular necrosis of bone of right hip     People in Home significant other     Facility Arrived From: home     Do you expect to return to your current living situation? Yes     Do you have help at home or someone to help you manage your care at home? Yes     Who are your caregiver(s) and their phone number(s)?  [facility] [phone]     Prior to hospitilization cognitive status: Unable to Assess     Current cognitive status: Alert/Oriented     Home Accessibility wheelchair accessible     Home Layout Able to live on 1st floor     Equipment Currently Used at Home walker, standard;cane, quad     Readmission within 30 days? No     Patient currently being followed by outpatient case management? No     Do you currently have service(s) that help you manage your care at home? No     Do you take prescription medications? Yes     Do you have prescription coverage? Yes     Do you have any problems affording any of your prescribed medications? No     Is the patient taking medications as prescribed? yes     Who is going to help you get home at discharge? [facility]) [phone]     How do you get to doctors appointments? car, drives self;family or friend will provide     Are you on dialysis? No     Do you take coumadin? No     DME Needed Upon Discharge  walker, rolling     Discharge Plan discussed with: Patient;Spouse/sig other     Name(s) and Number(s) Petra Lyons ([facility]) [phone]     Transition of Care Barriers Underinsured     Discharge Plan A Home with family;Home Health     Discharge Plan B Home with family;Home Health        Physical Activity    On average, how many days per week do you engage in moderate to strenuous exercise (like a brisk walk)? 0 days     On average, how many minutes do you engage in exercise at this level? 0 min        Financial Resource Strain    How hard is it for you to pay for the very basics like food, housing, medical care, and heating? Not hard at all        Housing Stability    In the last 12 months, was there a time when you were not able to pay the mortgage or rent on time? No     In the last 12 months, how many places have you lived? 1     In the last 12 months, was there a time when you did not have a steady place to sleep or slept in a shelter (including  now)? No        Transportation Needs    In the past 12 months, has lack of transportation kept you from medical appointments or from getting medications? No     In the past 12 months, has lack of transportation kept you from meetings, work, or from getting things needed for daily living? No        Food Insecurity    Within the past 12 months, you worried that your food would run out before you got the money to buy more. Never true     Within the past 12 months, the food you bought just didn't last and you didn't have money to get more. Never true        Stress    Do you feel stress - tense, restless, nervous, or anxious, or unable to sleep at night because your mind is troubled all the time - these days? Very much        Social Connections    In a typical week, how many times do you talk on the phone with family, friends, or neighbors? Three times a week     How often do you get together with friends or relatives? Three times a week     How often do you attend Evangelical or Amish services? More than 4 times per year     Do you belong to any clubs or organizations such as Evangelical groups, unions, fraternal or athletic groups, or school groups? No     How often do you attend meetings of the clubs or organizations you belong to? Never     Are you , , , , never , or living with a partner? Living with partner        Alcohol Use    Q1: How often do you have a drink containing alcohol? 2-3 times a week     Q2: How many drinks containing alcohol do you have on a typical day when you are drinking? 3 or 4     Q3: How often do you have six or more drinks on one occasion? Never        OTHER    Name(s) of People in Home Petra Lyons [employer] [phone]                   Sw spoke with pt at bedside to complete dcp initial assessment and arrange post acute services. Pta, pt lived home with [facility]ra [facility] [employer]) [phone]. Has standard walker and quad cane. Dcp is to [facility] with  [facility] and [facility] from [facility]. Referrals sending at this time. Ss following for dc needs and recommendations.

## 2023-10-30 NOTE — ANESTHESIA PROCEDURE NOTES
Peripheral Block    Patient location during procedure: OR   Block not for primary anesthetic.  Reason for block: at surgeon's request and post-op pain management   Post-op Pain Location: right hip   Start time: 10/30/2023 8:10 AM  Timeout: 10/30/2023 8:05 AM   End time: 10/30/2023 8:15 AM    Staffing  Authorizing Provider: Neftali Porras DO  Performing Provider: Neftali Porras DO    Staffing  Performed by: Neftali Porras DO  Authorized by: Neftali Porras DO    Preanesthetic Checklist  Completed: patient identified, IV checked, site marked, risks and benefits discussed, surgical consent, monitors and equipment checked, pre-op evaluation and timeout performed  Peripheral Block  Patient position: supine  Prep: ChloraPrep  Patient monitoring: heart rate, continuous pulse ox, frequent blood pressure checks and continuous capnometry  Block type: fascia iliaca  Laterality: right  Injection technique: single shot  Needle  Needle type: Stimuplex   Needle gauge: 20 G  Needle length: 4 in  Needle localization: anatomical landmarks and ultrasound guidance   -ultrasound image captured on disc.  Assessment  Injection assessment: negative aspiration  Heart rate change: no  Slow fractionated injection: yes  Pain Tolerance: comfortable throughout block  Medications:    Medications: ROPIvacaine (NAROPIN) injection 0.5% - Perineural   20 mL - 10/30/2023 8:15:00 AM  dexAMETHasone sodium phos (PF) injection 10 mg/mL - Other   10 mg - 10/30/2023 8:15:00 AM    Additional Notes  0.5% naropin diluted to 0.25% with 40cc total volume given

## 2023-10-30 NOTE — OP NOTE
Department of Orthopedic Surgery  Operative Note      NAME: Juan Mckeon    : 1970    SURGERY DATE: 10/30/2023     PREOPERATIVE DIAGNOSIS: Avascular necrosis of bone of right hip [M87.051]    POSTOPERATIVE DIAGNOSIS:  Avascular necrosis of bone of right hip [M87.051]    PROCEDURE: right total hip replacement arthroplasty   Implants  Biomet 54 shell  15 stem  Neutral neck  44 dual mobility head    SURGEON: Deacon Gu III, MD    ASSISTANT:  Luke    ANESTHESIA:  Spinal general    BLOOD LOSS:  100 cc     DRAINS: Hemovac drain x2    IMPLANT:  Implant Name Type Inv. Item Serial No.  Lot No. LRB No. Used Action   BIOMED ACETABULAR SYSTEM 54mm SHELL   [id number]  [id number] Right 1 Implanted   LINER DUAL MOBILITY G7 44MM F - [id number]  LINER DUAL MOBILITY G7 44MM F  [employer] [id number] Right 1 Implanted   BIOMET Vivacit-E 44mm VITAMIN E HIGHLY CROSSLINKED POLYETHYLENE BEARING, SIZE F   [id number] [employer]ET [id number] Right 1 Implanted   BIOMET HIP SYSTEM MODULAR HEAD COMPONENT 28mm   [id number] BIOMET [id number] Right 1 Implanted   STEM TAPERLOC RD T1 PPS SO 15 - [id number]  STEM TAPERLOC RD T1 PPS SO 15  [employer],INC [id number] Right 1 Implanted         INDICATIONS FOR PROCEDURE:   [unfilled] is a 52 y.o.-year-old with debilitating right-sided hip pain despite nonoperative measures and interested in hip arthroplasty.     PROCEDURE IN DETAIL: PROCEDURE IN DETAIL:  The patient was brought to the operating room.  Anesthesia as above was administered per Anesthesia.  The patient was then placed in a lateral decubitus position with right side up, an axillary roll was placed and all bony prominences were well padded.  Knee flexion with the hip in full extension was verified.  The leg lengths were verified comparing the two sides.  At this point, the leg was then prepped and draped in standard fashion.      A lateral approach was made starting superior to the greater trochanter and slightly  posterior extending over the greater trochanter and down in line with femoral shaft.  Dissection was carried down to the IT band.  The IT band was opened in the same alignment as the skin incision.  The IT band was retracted anteriorly and posteriorly.  The anterior one-third gluteus medius was then elevated off the greater trochanter, splitting the vastus lateralis.  The capsule was then T'd open with 0 Vicryl suture placed in the anterior and posterior flaps and statted.  The hip was then dislocated using longitudinal traction and external rotation.  A provisional neck cut was made approximately 1 fingerbreadth or 10 mm above the lesser trochanter and verified with a preoperative templates.  The head was passed to the back table to be sized and was sent to pathology for review at the end of the case.      A bone hook was placed at the level of the lesser trochanter retracting the femur posteriorly.  Attention was turned to the acetabulum.  A large curette was used to scrape the acetabulum down to subchondral bone. The reamers starting approximately four sizes below the size of the femoral head.  Trying to maintain the anatomic version of the acetabulum using the anterior and posterior walls with the reamer at approximately 45 degrees of flexion with approximately 20 degrees of anteversion.  Reaming in 1 mm increments up to where there was circumferential bleeding subchondral bone.  A trial acetabulum component size 54 was trialed verifying a stable fit.  The trial was then removed and the permanent acetabulum implant was impacted with approximately 45 degrees flexion and 20 degrees anteversion.  After confirming stability of the acetabulum prosthesis, the dual mobility liner was impacted in place.      A sterile lap was then placed to protect the cup.  Attention was turned to the femur.  The leg was placed into a sterile drape anteriorly off the anterior bed with the leg in a neutral position.  The canal was  reamed with a single reamer, then broached up to a size 15 were a good solid fit was obtained.  At this point trial reductions were performed starting with a 0 neck, which was modified based on leg lengths and quad tensioning with the hip in full extension to get the appropriate neck length, which was neutral.  Once the neck length had been verified.  The hip was dislocated once again placed back into a sterile drape anteriorly and the trial femoral stem removed.  The femoral implant size 15 was impacted in place.  A repeat trial reduction with a size 44 head with neutral neck femoral neck and a size 44 femoral head was performed.  The leg length and the muscle tension were both verified once again and the stability of the hip through range of motion was verified once again.  The hip was once again was dislocated.  The  permanaent femoral neck and head were placed.  The hip was taken through range of motion once again to verify stability and to verify the leg lengths with the permanent implants. The hip was then copiously irrigated out, the capsule was repaired with #2 Ti-Cron.  Medium Hemovac drain was placed deep x 2.  The leg was then placed on a well-padded Rivera stand abducting the hip during the remainder of the closure.  The gluteus medius was repaired back to the greater trochanter through bony tunnels using #2 Ti-Cron.  The vastus lateralis was closed using 0 Vicryl running.  The IT band was then repaired using #2 Ti-Cron interrupted.  Subcutaneous tissue was closed with 0 and 2-0 Vicryl interrupted and staples on the skin.  A silver sterile dressing was applied and an abduction pillow.  The patient was moved to a bed with the abduction pillow in place.  The patient tolerated the procedure well without complication.         Deacon Gu III, MD

## 2023-10-30 NOTE — PLAN OF CARE
Problem: Physical Therapy  Goal: Physical Therapy Goal  Description: Short Term Goals  Independent with HEP  Independent with crutchesx 200 feet PWB: right lower extremity  Independent with hip precautions    Long term goals  Needed equipment for home.           Outcome: Ongoing, Progressing

## 2023-10-30 NOTE — PLAN OF CARE
Received to op room 22 via stretcher from pacu. See flowsheet for full assessment. Abduction pillow in place. Hemovac noted to rt hip and charged. Gurjit yuki and gurjit plexi pulse in place. Ice bag placed to right hip.

## 2023-10-30 NOTE — PT/OT/SLP EVAL
Physical Therapy Evaluation    Patient Name:  Juan Mckeon   MRN:  [id number]    Recommendations:     Discharge Recommendations:     Discharge Equipment Recommendations: crutches   Barriers to discharge: Decreased caregiver support    Assessment:     Juan Mckeon is a 52 y.o. male admitted with a medical diagnosis of Avascular necrosis of bone of right hip.  He presents with the following impairments/functional limitations: orthopedic precautions, pain, gait instability, decreased ROM Patient with normal post op pain. Patient noted to be homeless but plans on staying with brother at [place]. Did well with crutches and pwb.. Precautions reviewed and he has good understanding. Plan is dc home tomorrow with brother.    Rehab Prognosis: Good; patient would benefit from acute skilled PT services to address these deficits and reach maximum level of function.    Recent Surgery: Procedure(s) (LRB):  ARTHROPLASTY, HIP REPLACEMENT (Right) Day of Surgery    Plan:     During this hospitalization, patient to be seen BID to address the identified rehab impairments via gait training, therapeutic activities and progress toward the following goals:    Plan of Care Expires:  11/27/23    Subjective     Chief Complaint: post op pain  Patient/Family Comments/goals: Patient agreeable to oob. Plans on staying with brother at [place].   Pain/Comfort:  Pain Rating 1: 6/10  Location - Side 1: Right  Location 1: hip  Pain Addressed 1: Pre-medicate for activity, Cessation of Activity    Patients cultural, spiritual, Jewish conflicts given the current situation: no    Living Environment:  Homeless, plans on staying with brother single story apartment  Prior to admission, patients level of function was independent.  Equipment used at home: cane, straight.  DME owned (not currently used): none.  Upon discharge, patient will have assistance from brother.    Objective:     Communicated with nurse prior to session.  Patient found supine with hemovac,  peripheral IV  upon PT entry to room.    General Precautions: Standard, fall  Orthopedic Precautions:RLE partial weight bearing   Braces: Hip abduction brace  Respiratory Status: Room air    Exams:  RLE ROM: WFL  RLE Strength: 3/5  LLE ROM: WNL  LLE Strength: WNL    Functional Mobility:  Bed Mobility:     Supine to Sit: minimum assistance  Sit to Supine: minimum assistance  Transfers:     Sit to Stand:  minimum assistance with axillary crutches  Gait: ambulated 20 feet with crutches pwb       AM-PAC 6 CLICK MOBILITY  Total Score:18       Treatment & Education:  Pwb with crutches  Hip precautions    Patient left HOB elevated with all lines intact.    GOALS:   Multidisciplinary Problems       Physical Therapy Goals          Problem: Physical Therapy    Goal Priority Disciplines Outcome Goal Variances Interventions   Physical Therapy Goal     PT, PT/OT Ongoing, Progressing     Description: Short Term Goals  Independent with HEP  Independent with crutchesx 200 feet PWB: right lower extremity  Independent with hip precautions    Long term goals  Needed equipment for home.                                History:     Past Medical History:   Diagnosis Date    Anxiety and depression     Avascular necrosis of bone of right hip 10/4/2023    Pt has had hip pain for more than 2 years. Pt reports recent fall approximately 2 mos ago. States he was evaluated at [facility]. Instructed to follow up with [facility]TH[facility] but since has not been able to do so.     COPD (chronic obstructive pulmonary disease) 9/27/2023    Coronary artery disease     GERD (gastroesophageal reflux disease)     Hyperlipidemia     Hypertension        Past Surgical History:   Procedure Laterality Date    SKIN GRAFT         Time Tracking:     PT Received On: 10/30/23  PT Start Time: 1710     PT Stop Time: 1745  PT Total Time (min): 35 min     Billable Minutes: Evaluation 35      10/30/2023

## 2023-10-31 VITALS
BODY MASS INDEX: 22.48 KG/M2 | HEART RATE: 73 BPM | RESPIRATION RATE: 17 BRPM | HEIGHT: 70 IN | TEMPERATURE: 98 F | OXYGEN SATURATION: 92 % | WEIGHT: 157 LBS | SYSTOLIC BLOOD PRESSURE: 165 MMHG | DIASTOLIC BLOOD PRESSURE: 85 MMHG

## 2023-10-31 LAB
25(OH)D3 SERPL-MCNC: 10.2 NG/ML
ALBUMIN SERPL BCP-MCNC: 2.7 G/DL (ref 3.5–5)
ALBUMIN/GLOB SERPL: 0.7 {RATIO}
ALP SERPL-CCNC: 102 U/L (ref 45–115)
ALT SERPL W P-5'-P-CCNC: 21 U/L (ref 16–61)
ANION GAP SERPL CALCULATED.3IONS-SCNC: 11 MMOL/L (ref 7–16)
AST SERPL W P-5'-P-CCNC: 26 U/L (ref 15–37)
BASOPHILS # BLD AUTO: 0.01 K/UL (ref 0–0.2)
BASOPHILS NFR BLD AUTO: 0.1 % (ref 0–1)
BILIRUB SERPL-MCNC: 0.3 MG/DL (ref ?–1.2)
BUN SERPL-MCNC: 11 MG/DL (ref 7–18)
BUN/CREAT SERPL: 12 (ref 6–20)
CALCIUM SERPL-MCNC: 8.2 MG/DL (ref 8.5–10.1)
CHLORIDE SERPL-SCNC: 103 MMOL/L (ref 98–107)
CO2 SERPL-SCNC: 25 MMOL/L (ref 21–32)
CREAT SERPL-MCNC: 0.92 MG/DL (ref 0.7–1.3)
DIFFERENTIAL METHOD BLD: ABNORMAL
EGFR (NO RACE VARIABLE) (RUSH/TITUS): 100 ML/MIN/1.73M2
EOSINOPHIL # BLD AUTO: 0 K/UL (ref 0–0.5)
EOSINOPHIL NFR BLD AUTO: 0 % (ref 1–4)
ERYTHROCYTE [DISTWIDTH] IN BLOOD BY AUTOMATED COUNT: 13.1 % (ref 11.5–14.5)
GLOBULIN SER-MCNC: 3.8 G/DL (ref 2–4)
GLUCOSE SERPL-MCNC: 135 MG/DL (ref 74–106)
HCT VFR BLD AUTO: 36.2 % (ref 40–54)
HGB BLD-MCNC: 12.6 G/DL (ref 13.5–18)
IMM GRANULOCYTES # BLD AUTO: 0.04 K/UL (ref 0–0.04)
IMM GRANULOCYTES NFR BLD: 0.4 % (ref 0–0.4)
LYMPHOCYTES # BLD AUTO: 1.14 K/UL (ref 1–4.8)
LYMPHOCYTES NFR BLD AUTO: 10.1 % (ref 27–41)
MCH RBC QN AUTO: 29.4 PG (ref 27–31)
MCHC RBC AUTO-ENTMCNC: 34.8 G/DL (ref 32–36)
MCV RBC AUTO: 84.4 FL (ref 80–96)
MONOCYTES # BLD AUTO: 1.11 K/UL (ref 0–0.8)
MONOCYTES NFR BLD AUTO: 9.8 % (ref 2–6)
MPC BLD CALC-MCNC: 10.7 FL (ref 9.4–12.4)
NEUTROPHILS # BLD AUTO: 9 K/UL (ref 1.8–7.7)
NEUTROPHILS NFR BLD AUTO: 79.6 % (ref 53–65)
NRBC # BLD AUTO: 0 X10E3/UL
NRBC, AUTO (.00): 0 %
PLATELET # BLD AUTO: 298 K/UL (ref 150–400)
POTASSIUM SERPL-SCNC: 3.8 MMOL/L (ref 3.5–5.1)
PROT SERPL-MCNC: 6.5 G/DL (ref 6.4–8.2)
RBC # BLD AUTO: 4.29 M/UL (ref 4.6–6.2)
SODIUM SERPL-SCNC: 135 MMOL/L (ref 136–145)
T4 SERPL-MCNC: 8.2 ΜG/DL (ref 4.5–12.1)
TSH SERPL DL<=0.005 MIU/L-ACNC: 0.31 UIU/ML (ref 0.36–3.74)
WBC # BLD AUTO: 11.3 K/UL (ref 4.5–11)

## 2023-10-31 PROCEDURE — 25000003 PHARM REV CODE 250: Performed by: ORTHOPAEDIC SURGERY

## 2023-10-31 PROCEDURE — 63600175 PHARM REV CODE 636 W HCPCS: Performed by: ORTHOPAEDIC SURGERY

## 2023-10-31 PROCEDURE — 84436 ASSAY OF TOTAL THYROXINE: CPT | Performed by: HOSPITALIST

## 2023-10-31 PROCEDURE — 80053 COMPREHEN METABOLIC PANEL: CPT | Performed by: ORTHOPAEDIC SURGERY

## 2023-10-31 PROCEDURE — 99214 OFFICE O/P EST MOD 30 MIN: CPT | Mod: ,,, | Performed by: HOSPITALIST

## 2023-10-31 PROCEDURE — 97166 OT EVAL MOD COMPLEX 45 MIN: CPT

## 2023-10-31 PROCEDURE — 99214 PR OFFICE/OUTPT VISIT, EST, LEVL IV, 30-39 MIN: ICD-10-PCS | Mod: ,,, | Performed by: HOSPITALIST

## 2023-10-31 PROCEDURE — G0180 MD CERTIFICATION HHA PATIENT: HCPCS | Mod: ,,, | Performed by: ORTHOPAEDIC SURGERY

## 2023-10-31 PROCEDURE — 82306 VITAMIN D 25 HYDROXY: CPT | Performed by: HOSPITALIST

## 2023-10-31 PROCEDURE — 97116 GAIT TRAINING THERAPY: CPT

## 2023-10-31 PROCEDURE — 97110 THERAPEUTIC EXERCISES: CPT

## 2023-10-31 PROCEDURE — 84443 ASSAY THYROID STIM HORMONE: CPT | Performed by: HOSPITALIST

## 2023-10-31 PROCEDURE — 85025 COMPLETE CBC W/AUTO DIFF WBC: CPT | Performed by: ORTHOPAEDIC SURGERY

## 2023-10-31 PROCEDURE — G0180 PR HOME HEALTH MD CERTIFICATION: ICD-10-PCS | Mod: ,,, | Performed by: ORTHOPAEDIC SURGERY

## 2023-10-31 RX ORDER — METAXALONE 800 MG/1
800 TABLET ORAL 3 TIMES DAILY PRN
Qty: 20 TABLET | Refills: 0 | Status: SHIPPED | OUTPATIENT
Start: 2023-10-31 | End: 2023-11-10

## 2023-10-31 RX ORDER — MORPHINE SULFATE 4 MG/ML
4 INJECTION, SOLUTION INTRAMUSCULAR; INTRAVENOUS EVERY 4 HOURS PRN
Status: DISCONTINUED | OUTPATIENT
Start: 2023-10-31 | End: 2023-10-31 | Stop reason: HOSPADM

## 2023-10-31 RX ADMIN — MORPHINE SULFATE 4 MG: 4 INJECTION, SOLUTION INTRAMUSCULAR; INTRAVENOUS at 02:10

## 2023-10-31 RX ADMIN — MORPHINE SULFATE 4 MG: 4 INJECTION, SOLUTION INTRAMUSCULAR; INTRAVENOUS at 08:10

## 2023-10-31 RX ADMIN — APIXABAN 2.5 MG: 2.5 TABLET, FILM COATED ORAL at 08:10

## 2023-10-31 RX ADMIN — CEFAZOLIN 2 G: 2 INJECTION, POWDER, FOR SOLUTION INTRAMUSCULAR; INTRAVENOUS at 12:10

## 2023-10-31 NOTE — SUBJECTIVE & OBJECTIVE
Past Medical History:   Diagnosis Date    Anxiety and depression     Avascular necrosis of bone of right hip 10/4/2023    Pt has had hip pain for more than 2 years. Pt reports recent fall approximately 2 mos ago. States he was evaluated at [facility]. Instructed to follow up with [facility]TH[facility] but since has not been able to do so.     COPD (chronic obstructive pulmonary disease) 9/27/2023    Coronary artery disease     GERD (gastroesophageal reflux disease)     Hyperlipidemia     Hypertension        Past Surgical History:   Procedure Laterality Date    SKIN GRAFT         Review of patient's allergies indicates:   Allergen Reactions    Shellfish containing products Shortness Of Breath and Swelling    Castellani paint [phenol]     Nuts [tree nut]        No current facility-administered medications on file prior to encounter.     Current Outpatient Medications on File Prior to Encounter   Medication Sig    calcium acetate,phosphat bind, (PHOSLO) 667 mg capsule Take 667 mg by mouth 3 (three) times daily with meals.    [DISCONTINUED] diclofenac sodium (VOLTAREN) 1 % Gel Apply 2 g topically 4 (four) times daily. for 14 days     Family History       Problem Relation (Age of Onset)    Arthritis Paternal Grandmother    Cancer Mother, Father, Maternal Aunt, Maternal Grandmother          Tobacco Use    Smoking status: Every Day     Types: Cigarettes    Smokeless tobacco: Never   Substance and Sexual Activity    Alcohol use: Yes    Drug use: Not Currently    Sexual activity: Yes     Review of Systems   Constitutional:  Negative for appetite change, fatigue and fever.   HENT:  Negative for congestion, hearing loss and trouble swallowing.    Respiratory:  Negative for chest tightness, shortness of breath and wheezing.    Cardiovascular:  Negative for chest pain and palpitations.   Gastrointestinal:  Negative for abdominal pain, constipation and nausea.   Genitourinary:  Negative for difficulty urinating and dysuria.   Musculoskeletal:  Positive  for arthralgias. Negative for back pain and neck stiffness.   Skin:  Negative for pallor and rash.   Neurological:  Negative for dizziness, speech difficulty and headaches.   Psychiatric/Behavioral:  Negative for confusion and suicidal ideas.      Objective:     Vital Signs (Most Recent):  Temp: 97.8 °F (36.6 °C) (10/30/23 2011)  Pulse: 78 (10/30/23 2011)  Resp: 18 (10/30/23 2011)  BP: (!) 155/79 (10/30/23 2011)  SpO2: 96 % (10/30/23 2011) Vital Signs (24h Range):  Temp:  [97.7 °F (36.5 °C)-97.8 °F (36.6 °C)] 97.8 °F (36.6 °C)  Pulse:  [59-93] 78  Resp:  [14-22] 18  SpO2:  [94 %-100 %] 96 %  BP: ()/(36-90) 155/79     Weight: 71.2 kg (157 lb)  Body mass index is 22.53 kg/m².     Physical Exam  Vitals reviewed.   Constitutional:       General: He is awake. He is not in acute distress.     Appearance: He is well-developed. He is not toxic-appearing.   HENT:      Head: Normocephalic.      Comments: Large cyst non inflamed posterior scalp     Nose: Nose normal.      Mouth/Throat:      Pharynx: Oropharynx is clear.   Eyes:      Extraocular Movements: Extraocular movements intact.      Pupils: Pupils are equal, round, and reactive to light.   Neck:      Thyroid: No thyroid mass.      Vascular: No carotid bruit.   Cardiovascular:      Rate and Rhythm: Normal rate and regular rhythm.      Pulses: Normal pulses.      Heart sounds: Normal heart sounds. No murmur heard.  Pulmonary:      Effort: Pulmonary effort is normal.      Breath sounds: Normal breath sounds and air entry. No wheezing.   Abdominal:      General: Bowel sounds are normal. There is no distension.      Palpations: Abdomen is soft.      Tenderness: There is no abdominal tenderness.   Musculoskeletal:      Cervical back: Neck supple. No rigidity.      Comments: S/p surgery right hip   Skin:     General: Skin is warm.      Coloration: Skin is not jaundiced.      Findings: No lesion.   Neurological:      General: No focal deficit present.      Mental Status:  He is alert and oriented to person, place, and time.      Cranial Nerves: No cranial nerve deficit.   Psychiatric:         Attention and Perception: Attention normal.         Mood and Affect: Mood normal.         Behavior: Behavior normal. Behavior is cooperative.         Thought Content: Thought content normal.         Cognition and Memory: Cognition normal.          Significant Labs: All pertinent labs within the past 24 hours have been reviewed.  BMP:   Recent Labs   Lab 10/30/23  1037         K 4.1      CO2 28   BUN 12   CREATININE 0.84   CALCIUM 8.3*     CBC:   Recent Labs   Lab 10/30/23  1037   WBC 9.96   HGB 12.8*   HCT 37.9*        CMP:   Recent Labs   Lab 10/30/23  1037      K 4.1      CO2 28      BUN 12   CREATININE 0.84   CALCIUM 8.3*   ANIONGAP 10       Significant Imaging: I have reviewed all pertinent imaging results/findings within the past 24 hours.      Intake/Output - Last 3 Shifts         10/29 0700  10/30 0659 10/30 0700  10/31 0659    IV Piggyback  500    Total Intake(mL/kg)  500 (7)    Drains  275    Blood  100    Total Output  375    Net  +125                Microbiology Results (last 7 days)       ** No results found for the last 168 hours. **

## 2023-10-31 NOTE — PLAN OF CARE
[facility] Tele[facility]  Discharge Final Note    Primary Care Provider: No, Primary Doctor    Expected Discharge Date: 10/31/2023    Final Discharge Note (most recent)       Final Note - 10/31/23 1207          Final Note    Assessment Type Final Discharge Note     Anticipated Discharge Disposition Home-Health Care Svc     What phone number can be called within the next 1-3 days to see how you are doing after discharge? [phone]        Post-Acute Status    Post-Acute Authorization HME;Home Health     HME Status Set-up Complete/Auth obtained     Home Health Status Set-up Complete/Auth obtained     Patient choice form signed by patient/caregiver List with quality metrics by geographic area provided;List from CMS Compare;List from System Post-Acute Care     Discharge Delays None known at this time                      Follow-up providers       Deacon Gu III, MD   Specialty: Orthopedic Surgery    [address] [place] [id number]   Phone: [phone]       Next Steps: Follow up on 11/2/2023    Instructions: For wound re-check; 1:40 pm              After-discharge care                Durable Medical Equipment       [facility]   Service: Durable Medical Equipment    [address] [place] [id number]   Phone: [phone]                 Home Medical Care       [facility]   Service: Home Health Services    [address] [facility] [address]   Phone: [phone]                             Pt to dc home with [facility] and rw on this day. No further dc needs noted.

## 2023-10-31 NOTE — ASSESSMENT & PLAN NOTE
Chronic, controlled. Latest blood pressure and vitals reviewed-     Temp:  [97.7 °F (36.5 °C)-97.8 °F (36.6 °C)]   Pulse:  [59-93]   Resp:  [14-22]   BP: ()/(36-90)   SpO2:  [94 %-100 %] .   Home meds for hypertension were reviewed and noted below.       While in the hospital, will manage blood pressure as follows; Adjust home antihypertensive regimen as follows- watched outpt recently off meds after taking for over one year    Will utilize p.r.n. blood pressure medication only if patient's blood pressure greater than 180/110 and he develops symptoms such as worsening chest pain or shortness of breath.

## 2023-10-31 NOTE — PT/OT/SLP PROGRESS
Physical Therapy Treatment    Patient Name:  Juan Mckeon   MRN:  [id number]    Recommendations:     Discharge Recommendations:    Discharge Equipment Recommendations: crutches  Barriers to discharge: Decreased caregiver support    Assessment:     Juan Mckeon is a 52 y.o. male admitted with a medical diagnosis of Avascular necrosis of bone of right hip.  He presents with the following impairments/functional limitations: orthopedic precautions, pain, gait instability, decreased ROM Patient able to use crutches pwb and understands hip precautions. Okay to dc home with family to assist.    Rehab Prognosis: Good; patient would benefit from acute skilled PT services to address these deficits and reach maximum level of function.    Recent Surgery: Procedure(s) (LRB):  ARTHROPLASTY, HIP REPLACEMENT (Right) 1 Day Post-Op    Plan:     During this hospitalization, patient to be seen BID to address the identified rehab impairments via gait training, therapeutic activities and progress toward the following goals:    Plan of Care Expires:  11/27/23    Subjective     Chief Complaint: post op pain  Patient/Family Comments/goals: Patient states he is going home  Pain/Comfort:  Pain Rating 1: 6/10  Location - Side 1: Right  Location 1: hip  Pain Addressed 1: Cessation of Activity, Pre-medicate for activity  Pain Addressed 2: Cessation of Activity, Pre-medicate for activity      Objective:     Communicated with nurse prior to session.  Patient found supine with hemovac, peripheral IV upon PT entry to room.     General Precautions: Standard, fall  Orthopedic Precautions: RLE partial weight bearing  Braces: Hip abduction brace  Respiratory Status: Room air     Functional Mobility:  Bed Mobility:     Supine to Sit: minimum assistance  Sit to Supine: minimum assistance  Transfers:     Sit to Stand:  contact guard assistance with axillary crutches  Gait: ambulated 50 feet with crutches pwb right le cga, antalgic gait      AM-PAC 6 CLICK  MOBILITY  Turning over in bed (including adjusting bedclothes, sheets and blankets)?: 3  Sitting down on and standing up from a chair with arms (e.g., wheelchair, bedside commode, etc.): 4  Moving from lying on back to sitting on the side of the bed?: 3  Moving to and from a bed to a chair (including a wheelchair)?: 3  Need to walk in hospital room?: 4  Climbing 3-5 steps with a railing?: 4  Basic Mobility Total Score: 21       Treatment & Education:  Hip precautions  Right le: aps, qs, abd-add, saq 3x10    Patient left supine with all lines intact..    GOALS:   Multidisciplinary Problems       Physical Therapy Goals          Problem: Physical Therapy    Goal Priority Disciplines Outcome Goal Variances Interventions   Physical Therapy Goal     PT, PT/OT Ongoing, Progressing     Description: Short Term Goals  Independent with HEP  Independent with crutchesx 200 feet PWB: right lower extremity  Independent with hip precautions    Long term goals  Needed equipment for home.                                Time Tracking:     PT Received On: 10/31/23  PT Start Time: 0935     PT Stop Time: 1000  PT Total Time (min): 25 min     Billable Minutes: Gait Training 10 and Therapeutic Exercise 15    Treatment Type: Evaluation  PT/PTA: PT     Number of PTA visits since last PT visit: 0     10/31/2023
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
## 2023-10-31 NOTE — PT/OT/SLP EVAL
Occupational Therapy   Evaluation and Discharge Note    Name: Juan Mckeon  MRN: [id number]  Admitting Diagnosis: Avascular necrosis of bone of right hip  Recent Surgery: Procedure(s) (LRB):  ARTHROPLASTY, HIP REPLACEMENT (Right) 1 Day Post-Op    Recommendations:     Discharge Recommendations:    Discharge Equipment Recommendations: crutches, axillary  Barriers to discharge:  Inaccessible home environment    Assessment:     Juan Mckeon is a 52 y.o. male with a medical diagnosis of Avascular necrosis of bone of right hip. At this time, patient is functioning at their prior level of function and does not require further acute OT services.     Plan:     During this hospitalization, patient does not require further acute OT services.  Please re-consult if situation changes.    Plan of Care Reviewed with: patient    Subjective     Chief Complaint: Pt c/o hip P! With movement  Patient/Family Comments/goals: return to previous level of function    Occupational Profile:  Living Environment: Pt is homeless states he will be going to stay with his brother at D/C  Previous level of function: I with ADLs  Roles and Routines: brother  Equipment Used at home:    Assistance upon Discharge: Pt would benefit from outpatient PT    Pain/Comfort:  Pain Rating 1: 9/10  Location - Side 1: Right  Location 1: hip  Pain Addressed 1: Cessation of Activity, Distraction    Patients cultural, spiritual, Church conflicts given the current situation:      Objective:     Communicated with: RN  prior to session.  Patient found up in chair with wound vac present  upon OT entry to room.    General Precautions: Standard, fall  Orthopedic Precautions: hip precautions   Braces:    Respiratory Status: Room air     Occupational Performance:    Bed Mobility:        Functional Mobility/Transfers:  Patient completed Sit <> Stand Transfer with stand by assistance  with  no assistive device   Functional Mobility:     Activities of Daily Living:  Upper Body  Dressing: independence Pt I with donning button up shirt  Lower Body Dressing: moderate assistance Pt required Mod A to don underwear and pants over RLE, to able to pull them up    Cognitive/Visual Perceptual:      Physical Exam:  Pt educated on hip precautions an    AMPAC 6 Click ADL:  AMPAC Total Score: 21    Treatment & Education:  Pt educated on hip precautions during LB dressing    Patient left up in chair with all lines intact and call button in reach    GOALS:   Multidisciplinary Problems       Occupational Therapy Goals       Not on file                    History:     Past Medical History:   Diagnosis Date    Anxiety and depression     Avascular necrosis of bone of right hip 10/4/2023    Pt has had hip pain for more than 2 years. Pt reports recent fall approximately 2 mos ago. States he was evaluated at [facility]. Instructed to follow up with ORTHO but since has not been able to do so.     COPD (chronic obstructive pulmonary disease) 9/27/2023    Coronary artery disease     GERD (gastroesophageal reflux disease)     Hyperlipidemia     Hypertension          Past Surgical History:   Procedure Laterality Date    HIP REPLACEMENT ARTHROPLASTY Right 10/30/2023    Procedure: ARTHROPLASTY, HIP REPLACEMENT;  Surgeon: Deacon Gu III, MD;  Location: [facility];  Service: Orthopedics;  Laterality: Right;    SKIN GRAFT         Time Tracking:     OT Date of Treatment:    OT Start Time: 1115  OT Stop Time: 1130  OT Total Time (min): 15 min    Billable Minutes:Evaluation 15    10/31/2023   Finasteride Pregnancy And Lactation Text: This medication is absolutely contraindicated during pregnancy. It is unknown if it is excreted in breast milk.

## 2023-10-31 NOTE — DISCHARGE SUMMARY
"                      Department of Orthopedic Surgery                 DISCHARGE SUMMARY           Patient Name: Juan Mckeon  : 1970  Age: 52 y.o.  Sex: male  LOS: 0    ADMISSION INFORMATION:  Admit Date: 10/30/2023    DISCHARGE INFORMATION:  Discharge Date/Time:  10/31/2023  Discharge Physician: Riccardo  Discharged Condition: Stable      Admitting Diagnoses:  Avascular necrosis of bone of right hip [M87.051]    PROCEDURE:  Procedure(s) (LRB):  ARTHROPLASTY, HIP REPLACEMENT (Right)       PROCEDURE: right total hip replacement arthroplasty   Implants  Biomet 54 shell  15 stem  Neutral neck  44 dual mobility head    Discharge Diagnoses:  Right hip AVN status post total hip replacement    HOSPITAL COURSE:   Juan Mckeon  is a 52 y.o. patient was admitted 10/30/2023 underwent a right total hip replacement under general and spinal please see operative report dual mobility Press-Fit hip he will be discharged today if stable on crutches he is 50% weight-bearing currently we will see him back in the office Thursday to do a dressing change cause he is not a candidate for home health and will have to be set up for outpatient PT      Physical Exam for Today :  BP (!) 143/77 (BP Location: Right arm)   Pulse 74   Temp 97.5 °F (36.4 °C) (Oral)   Resp 20   Ht 5' 10" (1.778 m)   Wt 71.2 kg (157 lb)   SpO2 97%   BMI 22.53 kg/m²     Alert oriented cooperative  Thigh and calf were soft  Drain in place we pulled prior to discharge  Dressings dry  Neurovascularly intact      IMAGING: X-Ray Hip 2 or 3 views Right  Narrative: EXAMINATION:  XR HIP WITH PELVIS WHEN PERFORMED, 2 OR 3  VIEWS RIGHT    CLINICAL HISTORY:  Right total hip;    TECHNIQUE:  AP view of the pelvis and frog leg lateral view of the right hip were performed.    COMPARISON:  2023    FINDINGS:  Postoperative changes are now present from right total hip arthroplasty.  Prosthetic projects in expected location.  No acute abnormality " identified.  Impression: Postoperative changes.    Electronically signed by: Ji Grande  Date:    10/30/2023  Time:    11:12        .LABRCNT[wbc,hct,hgb,plt,Na,K,BUN,creatinie,INR,aPTT      DISCHARGE MEDS:     Medication List        START taking these medications      apixaban 2.5 mg Tab  Commonly known as: ELIQUIS  Take 1 tablet (2.5 mg total) by mouth 2 (two) times daily.     metaxalone 800 MG tablet  Commonly known as: SKELAXIN  Take 1 tablet (800 mg total) by mouth 3 (three) times daily as needed for Pain.     oxyCODONE-acetaminophen 7.5-325 mg per tablet  Commonly known as: PERCOCET  Take 1 tablet by mouth every 6 (six) hours as needed for Pain.            CONTINUE taking these medications      albuterol 90 mcg/actuation inhaler  Commonly known as: PROVENTIL/VENTOLIN HFA  Inhale 1-2 puffs into the lungs every 6 (six) hours as needed for Wheezing. Rescue     benzonatate 100 MG capsule  Commonly known as: TESSALON  Take 1 capsule (100 mg total) by mouth 3 (three) times daily as needed for Cough.     calcium acetate(phosphat bind) 667 mg capsule  Commonly known as: PHOSLO     cefdinir 300 MG capsule  Commonly known as: OMNICEF  Take 1 capsule (300 mg total) by mouth 2 (two) times daily. for 10 days     methylPREDNISolone 4 mg tablet  Commonly known as: MEDROL DOSEPACK  Take as prescribed            STOP taking these medications      diclofenac sodium 1 % Gel  Commonly known as: VOLTAREN               Where to Get Your Medications        [facility] at [address] [address]      Phone: [phone]   apixaban 2.5 mg Tab  metaxalone 800 MG tablet  oxyCODONE-acetaminophen 7.5-325 mg per tablet         CONSULTS: IP CONSULT TO HOSPITAL MEDICINE  IP CONSULT TO SOCIAL WORK/CASE MANAGEMENT    Follow up:  Thursday to change dressing    DISPOSITION: Home or Self Care    CONDITION: Stable              Deacon Gu III  10/31/2023, 7:58  AM        (Subject to voice recognition error, transcription service not allowed)

## 2023-11-01 ENCOUNTER — HOSPITAL ENCOUNTER (EMERGENCY)
Facility: HOSPITAL | Age: 53
Discharge: HOME | End: 2023-11-01
Attending: EMERGENCY MEDICINE
Payer: COMMERCIAL

## 2023-11-01 VITALS
TEMPERATURE: 98 F | SYSTOLIC BLOOD PRESSURE: 154 MMHG | HEART RATE: 110 BPM | OXYGEN SATURATION: 99 % | WEIGHT: 157 LBS | HEIGHT: 70 IN | DIASTOLIC BLOOD PRESSURE: 89 MMHG | RESPIRATION RATE: 19 BRPM | BODY MASS INDEX: 22.48 KG/M2

## 2023-11-01 DIAGNOSIS — R10.12 LEFT UPPER QUADRANT ABDOMINAL PAIN: Primary | ICD-10-CM

## 2023-11-01 DIAGNOSIS — R07.9 CHEST PAIN: ICD-10-CM

## 2023-11-01 DIAGNOSIS — K59.00 CONSTIPATION, UNSPECIFIED CONSTIPATION TYPE: ICD-10-CM

## 2023-11-01 LAB
ALBUMIN SERPL BCP-MCNC: 2.7 G/DL (ref 3.5–5)
ALBUMIN/GLOB SERPL: 0.7 {RATIO}
ALP SERPL-CCNC: 121 U/L (ref 45–115)
ALT SERPL W P-5'-P-CCNC: 64 U/L (ref 16–61)
AMPHET UR QL SCN: NEGATIVE
ANION GAP SERPL CALCULATED.3IONS-SCNC: 12 MMOL/L (ref 7–16)
AST SERPL W P-5'-P-CCNC: 59 U/L (ref 15–37)
BARBITURATES UR QL SCN: NEGATIVE
BASOPHILS # BLD AUTO: 0.01 K/UL (ref 0–0.2)
BASOPHILS NFR BLD AUTO: 0.1 % (ref 0–1)
BENZODIAZ METAB UR QL SCN: NEGATIVE
BILIRUB SERPL-MCNC: 0.6 MG/DL (ref ?–1.2)
BILIRUB UR QL STRIP: NEGATIVE
BUN SERPL-MCNC: 8 MG/DL (ref 7–18)
BUN/CREAT SERPL: 9 (ref 6–20)
CALCIUM SERPL-MCNC: 8.8 MG/DL (ref 8.5–10.1)
CANNABINOIDS UR QL SCN: NEGATIVE
CHLORIDE SERPL-SCNC: 99 MMOL/L (ref 98–107)
CLARITY UR: CLEAR
CO2 SERPL-SCNC: 28 MMOL/L (ref 21–32)
COCAINE UR QL SCN: NEGATIVE
COLOR UR: COLORLESS
CREAT SERPL-MCNC: 0.94 MG/DL (ref 0.7–1.3)
DIFFERENTIAL METHOD BLD: ABNORMAL
EGFR (NO RACE VARIABLE) (RUSH/TITUS): 98 ML/MIN/1.73M2
EOSINOPHIL # BLD AUTO: 0.1 K/UL (ref 0–0.5)
EOSINOPHIL NFR BLD AUTO: 1 % (ref 1–4)
ERYTHROCYTE [DISTWIDTH] IN BLOOD BY AUTOMATED COUNT: 13.4 % (ref 11.5–14.5)
GLOBULIN SER-MCNC: 3.8 G/DL (ref 2–4)
GLUCOSE SERPL-MCNC: 100 MG/DL (ref 74–106)
GLUCOSE UR STRIP-MCNC: NORMAL MG/DL
HCT VFR BLD AUTO: 36.4 % (ref 40–54)
HGB BLD-MCNC: 12.6 G/DL (ref 13.5–18)
IMM GRANULOCYTES # BLD AUTO: 0.06 K/UL (ref 0–0.04)
IMM GRANULOCYTES NFR BLD: 0.6 % (ref 0–0.4)
KETONES UR STRIP-SCNC: NEGATIVE MG/DL
LEUKOCYTE ESTERASE UR QL STRIP: NEGATIVE
LYMPHOCYTES # BLD AUTO: 1.85 K/UL (ref 1–4.8)
LYMPHOCYTES NFR BLD AUTO: 18 % (ref 27–41)
MAGNESIUM SERPL-MCNC: 1.9 MG/DL (ref 1.7–2.3)
MCH RBC QN AUTO: 29.4 PG (ref 27–31)
MCHC RBC AUTO-ENTMCNC: 34.6 G/DL (ref 32–36)
MCV RBC AUTO: 84.8 FL (ref 80–96)
MONOCYTES # BLD AUTO: 0.96 K/UL (ref 0–0.8)
MONOCYTES NFR BLD AUTO: 9.3 % (ref 2–6)
MPC BLD CALC-MCNC: 9.8 FL (ref 9.4–12.4)
NEUTROPHILS # BLD AUTO: 7.31 K/UL (ref 1.8–7.7)
NEUTROPHILS NFR BLD AUTO: 71 % (ref 53–65)
NITRITE UR QL STRIP: NEGATIVE
NRBC # BLD AUTO: 0 X10E3/UL
NRBC, AUTO (.00): 0 %
OPIATES UR QL SCN: POSITIVE
PCP UR QL SCN: NEGATIVE
PH UR STRIP: 7.5 PH UNITS
PLATELET # BLD AUTO: 291 K/UL (ref 150–400)
POTASSIUM SERPL-SCNC: 3.7 MMOL/L (ref 3.5–5.1)
PROT SERPL-MCNC: 6.5 G/DL (ref 6.4–8.2)
PROT UR QL STRIP: NEGATIVE
RBC # BLD AUTO: 4.29 M/UL (ref 4.6–6.2)
RBC # UR STRIP: NEGATIVE /UL
SODIUM SERPL-SCNC: 135 MMOL/L (ref 136–145)
SP GR UR STRIP: 1.01
TROPONIN I SERPL DL<=0.01 NG/ML-MCNC: 19.6 PG/ML
UROBILINOGEN UR STRIP-ACNC: NORMAL MG/DL
WBC # BLD AUTO: 10.29 K/UL (ref 4.5–11)

## 2023-11-01 PROCEDURE — 25000003 PHARM REV CODE 250: Performed by: EMERGENCY MEDICINE

## 2023-11-01 PROCEDURE — 80053 COMPREHEN METABOLIC PANEL: CPT | Performed by: EMERGENCY MEDICINE

## 2023-11-01 PROCEDURE — 96375 TX/PRO/DX INJ NEW DRUG ADDON: CPT

## 2023-11-01 PROCEDURE — 83735 ASSAY OF MAGNESIUM: CPT | Performed by: EMERGENCY MEDICINE

## 2023-11-01 PROCEDURE — 85025 COMPLETE CBC W/AUTO DIFF WBC: CPT | Performed by: EMERGENCY MEDICINE

## 2023-11-01 PROCEDURE — 80307 DRUG TEST PRSMV CHEM ANLYZR: CPT | Performed by: EMERGENCY MEDICINE

## 2023-11-01 PROCEDURE — 93005 ELECTROCARDIOGRAM TRACING: CPT

## 2023-11-01 PROCEDURE — 99285 EMERGENCY DEPT VISIT HI MDM: CPT | Mod: 25

## 2023-11-01 PROCEDURE — 96361 HYDRATE IV INFUSION ADD-ON: CPT

## 2023-11-01 PROCEDURE — 63600175 PHARM REV CODE 636 W HCPCS: Performed by: EMERGENCY MEDICINE

## 2023-11-01 PROCEDURE — 99284 EMERGENCY DEPT VISIT MOD MDM: CPT | Mod: ,,, | Performed by: EMERGENCY MEDICINE

## 2023-11-01 PROCEDURE — 81003 URINALYSIS AUTO W/O SCOPE: CPT | Mod: 59 | Performed by: EMERGENCY MEDICINE

## 2023-11-01 PROCEDURE — 99284 PR EMERGENCY DEPT VISIT,LEVEL IV: ICD-10-PCS | Mod: ,,, | Performed by: EMERGENCY MEDICINE

## 2023-11-01 PROCEDURE — 96365 THER/PROPH/DIAG IV INF INIT: CPT | Mod: 59

## 2023-11-01 PROCEDURE — 93010 EKG 12-LEAD: ICD-10-PCS | Mod: ,,, | Performed by: STUDENT IN AN ORGANIZED HEALTH CARE EDUCATION/TRAINING PROGRAM

## 2023-11-01 PROCEDURE — 84484 ASSAY OF TROPONIN QUANT: CPT | Performed by: EMERGENCY MEDICINE

## 2023-11-01 PROCEDURE — 25500020 PHARM REV CODE 255: Performed by: EMERGENCY MEDICINE

## 2023-11-01 PROCEDURE — 93010 ELECTROCARDIOGRAM REPORT: CPT | Mod: ,,, | Performed by: STUDENT IN AN ORGANIZED HEALTH CARE EDUCATION/TRAINING PROGRAM

## 2023-11-01 RX ORDER — METHYLPREDNISOLONE SOD SUCC 125 MG
125 VIAL (EA) INJECTION
Status: COMPLETED | OUTPATIENT
Start: 2023-11-01 | End: 2023-11-01

## 2023-11-01 RX ORDER — DIPHENHYDRAMINE HYDROCHLORIDE 50 MG/ML
50 INJECTION INTRAMUSCULAR; INTRAVENOUS
Status: COMPLETED | OUTPATIENT
Start: 2023-11-01 | End: 2023-11-01

## 2023-11-01 RX ORDER — SYRING-NEEDL,DISP,INSUL,0.3 ML 29 G X1/2"
296 SYRINGE, EMPTY DISPOSABLE MISCELLANEOUS
Status: COMPLETED | OUTPATIENT
Start: 2023-11-01 | End: 2023-11-01

## 2023-11-01 RX ORDER — MORPHINE SULFATE 4 MG/ML
4 INJECTION, SOLUTION INTRAMUSCULAR; INTRAVENOUS
Status: COMPLETED | OUTPATIENT
Start: 2023-11-01 | End: 2023-11-01

## 2023-11-01 RX ADMIN — METHYLPREDNISOLONE SODIUM SUCCINATE 125 MG: 125 INJECTION INTRAMUSCULAR; INTRAVENOUS at 12:11

## 2023-11-01 RX ADMIN — IOPAMIDOL 100 ML: 755 INJECTION, SOLUTION INTRAVENOUS at 01:11

## 2023-11-01 RX ADMIN — PROMETHAZINE HYDROCHLORIDE 25 MG: 25 INJECTION INTRAMUSCULAR; INTRAVENOUS at 12:11

## 2023-11-01 RX ADMIN — SODIUM CHLORIDE 1000 ML: 9 INJECTION, SOLUTION INTRAVENOUS at 12:11

## 2023-11-01 RX ADMIN — MORPHINE SULFATE 4 MG: 4 INJECTION, SOLUTION INTRAMUSCULAR; INTRAVENOUS at 12:11

## 2023-11-01 RX ADMIN — MAGNESIUM CITRATE 296 ML: 1.75 LIQUID ORAL at 01:11

## 2023-11-01 RX ADMIN — DIPHENHYDRAMINE HYDROCHLORIDE 50 MG: 50 INJECTION INTRAMUSCULAR; INTRAVENOUS at 12:11

## 2023-11-01 NOTE — ED PROVIDER NOTES
Encounter Date: 11/1/2023       History     Chief Complaint   Patient presents with    Chest Pain     Patient is a 52 year old male who presents via EMS for evaluation of chest pain that began last night after eating dinner. Patient states the pain woke him from sleep around 1 or 2 am with diaphoresis and chills. He states the pain is in his RUQ and radiates to his back and up his side. He complains of associated nausea without vomiting. He has had a slight cough, but no fevers or recent illness. He denies any associated back pain, dysuria, or shortness of breath. He states he has not had a bowel movement for the last 4-5 days but is passing some gas. Patient had a right hip replacement 2 days ago due to avascular necrosis and was discharged yesterday with Percocet for pain.      Review of patient's allergies indicates:   Allergen Reactions    Shellfish containing products Shortness Of Breath and Swelling    Castellani paint [phenol]     Nuts [tree nut]      Past Medical History:   Diagnosis Date    Anxiety and depression     Avascular necrosis of bone of right hip 10/4/2023    Pt has had hip pain for more than 2 years. Pt reports recent fall approximately 2 mos ago. States he was evaluated at [facility]. Instructed to follow up with ORTHO but since has not been able to do so.     COPD (chronic obstructive pulmonary disease) 9/27/2023    Coronary artery disease     GERD (gastroesophageal reflux disease)     Hyperlipidemia     Hypertension      Past Surgical History:   Procedure Laterality Date    HIP REPLACEMENT ARTHROPLASTY Right 10/30/2023    Procedure: ARTHROPLASTY, HIP REPLACEMENT;  Surgeon: Deacon uG III, MD;  Location: [facility];  Service: Orthopedics;  Laterality: Right;    SKIN GRAFT       Family History   Problem Relation Age of Onset    Cancer Mother     Cancer Father     Cancer Maternal Aunt     Cancer Maternal Grandmother     Arthritis Paternal Grandmother      Social History     Tobacco Use     Smoking status: Every Day     Types: Cigarettes    Smokeless tobacco: Never   Substance Use Topics    Alcohol use: Yes    Drug use: Not Currently     Review of Systems   Constitutional:  Positive for diaphoresis. Negative for appetite change and fever.   HENT:  Negative for sore throat.    Respiratory:  Positive for cough. Negative for chest tightness and shortness of breath.    Cardiovascular:  Negative for chest pain.   Gastrointestinal:  Positive for abdominal pain (RUQ), constipation and nausea. Negative for blood in stool, diarrhea and vomiting.   Genitourinary:  Negative for dysuria.   Musculoskeletal:  Negative for back pain.   Skin:  Negative for rash.   Neurological:  Negative for weakness.   Hematological:  Does not bruise/bleed easily.   Psychiatric/Behavioral:  Negative for suicidal ideas.    All other systems reviewed and are negative.      Physical Exam     Initial Vitals [11/01/23 1144]   BP Pulse Resp Temp SpO2   (!) 177/93 83 18 97.9 °F (36.6 °C) 100 %      MAP       --         Physical Exam    Nursing note and vitals reviewed.  Constitutional: He appears distressed.   HENT:   Head: Normocephalic and atraumatic.   Mouth/Throat: Oropharynx is clear and moist.   Eyes: Pupils are equal, round, and reactive to light.   Neck: Neck supple.   Normal range of motion.  Cardiovascular:  Normal rate and regular rhythm.           Pulmonary/Chest: Effort normal and breath sounds normal.   Abdominal: Abdomen is soft. He exhibits no distension. There is abdominal tenderness.   RUQ tenderness on palpation   Genitourinary:    Genitourinary Comments: Rectal exam negative     Musculoskeletal:         General: Normal range of motion.      Cervical back: Normal range of motion and neck supple.     Neurological: He is alert.   Skin: Skin is warm. Capillary refill takes less than 2 seconds.   Psychiatric: He has a normal mood and affect.         Medical Screening Exam   See Full Note    ED Course   Procedures  Labs  Reviewed   COMPREHENSIVE METABOLIC PANEL - Abnormal; Notable for the following components:       Result Value    Sodium 135 (*)     Albumin 2.7 (*)     Alk Phos 121 (*)     ALT 64 (*)     AST 59 (*)     All other components within normal limits   CBC WITH DIFFERENTIAL - Abnormal; Notable for the following components:    RBC 4.29 (*)     Hemoglobin 12.6 (*)     Hematocrit 36.4 (*)     Neutrophils % 71.0 (*)     Lymphocytes % 18.0 (*)     Monocytes % 9.3 (*)     Immature Granulocytes % 0.6 (*)     Monocytes, Absolute 0.96 (*)     Immature Granulocytes, Absolute 0.06 (*)     All other components within normal limits   MAGNESIUM - Normal   TROPONIN I - Normal   CBC W/ AUTO DIFFERENTIAL    Narrative:     The following orders were created for panel order CBC auto differential.  Procedure                               Abnormality         Status                     ---------                               -----------         ------                     CBC with Differential[[id number]]       Abnormal            Final result                 Please view results for these tests on the individual orders.   URINALYSIS, REFLEX TO URINE CULTURE   DRUG SCREEN, URINE (BEAKER)          Imaging Results              X-Ray Chest AP Portable (Final result)  Result time 11/01/23 13:48:57      Final result by Matt Bull MD (11/01/23 13:48:57)                   Impression:      No new or worsening process    Mild residual atelectasis/infiltrate left lung base, improved in the interval      Electronically signed by: Matt Bull  Date:    11/01/2023  Time:    13:48               Narrative:    EXAMINATION:  XR CHEST AP PORTABLE    CLINICAL HISTORY:  Chest pain;.    COMPARISON:  October 23, 2023    TECHNIQUE:  AP portable erect chest    FINDINGS:  Cardiac silhouette is not enlarged.  There is no mediastinal mass.  There is no pulmonary vascular engorgement.    There is improved aeration in the lung bases, with a small amount of  residual atelectasis/infiltrate of uncertain chronicity in the left base.  Lungs and pleural spaces are otherwise clear.    Osseous structures are unchanged                                       CT Abdomen Pelvis With IV Contrast (Final result)  Result time 11/01/23 13:58:45      Final result by Jose David Liz DO (11/01/23 13:58:45)                   Impression:      No convincing acute CT findings.    The CT exam was performed using one or more of the following dose    reduction techniques- Automated exposure control, adjustment of the mA    and/or kV according to patient size, and/or use of iterative    reconstructed technique.    Point of Service: [facility]      Electronically signed by: Jose David Liz  Date:    11/01/2023  Time:    13:58               Narrative:    EXAMINATION:  CT ABDOMEN PELVIS WITH IV CONTRAST    CLINICAL HISTORY:  Bowel obstruction suspected;    COMPARISON:  None    TECHNIQUE:  Multiple axial tomographic images of the abdomen and pelvis were obtained after administration of 100 cc Isovue 370 intravenous contrast.    FINDINGS:  Mild dependent changes of the lungs noted.    No worrisome focal hepatic abnormality demonstrated on submitted images.  Visualized gallbladder grossly unremarkable.  Visualized pancreas appears unremarkable.  Spleen grossly unremarkable.    Bilateral adrenal glands grossly unremarkable.  Bilateral kidneys appear grossly unremarkable.  Urinary bladder incompletely distended.  Evaluation the pelvis somewhat limited secondary to metallic streak artifact from total right hip arthroplasty.    No convincing evidence of gastrointestinal obstruction or acute appendicitis.  Vasculature grossly unremarkable.  Scattered skeletal degenerative change.                                       Medications   sodium chloride 0.9% bolus 1,000 mL 1,000 mL (1,000 mLs Intravenous New Bag 11/1/23 1222)   promethazine (PHENERGAN) 25 mg in dextrose 5 % (D5W) 50 mL IVPB (0 mg  Intravenous Stopped 11/1/23 1249)   morphine injection 4 mg (4 mg Intravenous Given 11/1/23 1222)   magnesium citrate solution 296 mL (296 mLs Oral Given 11/1/23 1317)   methylPREDNISolone sodium succinate injection 125 mg (125 mg Intravenous Given 11/1/23 1242)   diphenhydrAMINE injection 50 mg (50 mg Intravenous Given 11/1/23 1242)   iopamidoL (ISOVUE-370) injection 100 mL (100 mLs Intravenous Given 11/1/23 1353)     Medical Decision Making  Amount and/or Complexity of Data Reviewed  Labs: ordered. Decision-making details documented in ED Course.  Radiology: ordered.    Risk  OTC drugs.  Prescription drug management.               ED Course as of 11/01/23 1437   Wed Nov 01, 2023   1427 Urinalysis is normal.  CMP is normal.  Troponin is normal.  Magnesium is normal.  CBC is normal. [BB]   1428 CT abdomen shows no acute findings. [BB]   1428 Chest x-ray by my interpretation shows no acute disease. [BB]   1430 Immature Grans (Abs)(!): 0.06 [BB]   1435 On repeat examination symptoms are completely resolved, patient states he feels better and wants to go home. [BB]      ED Course User Index  [BB] Pravin Cordero MD                    Clinical Impression:   Final diagnoses:  [R07.9] Chest pain  [R10.12] Left upper quadrant abdominal pain (Primary)  [K59.00] Constipation, unspecified constipation type        ED Disposition Condition    Discharge Stable          ED Prescriptions    None       Follow-up Information    None          Pravin Cordero MD  11/01/23 1437
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
## 2023-11-01 NOTE — ASSESSMENT & PLAN NOTE
Chronic, controlled. Latest blood pressure and vitals reviewed-     Temp:  [97.5 °F (36.4 °C)-97.8 °F (36.6 °C)]   Pulse:  [68-74]   Resp:  [17-20]   BP: (143-175)/(77-85)   SpO2:  [92 %-97 %] .   Home meds for hypertension were reviewed and noted below.       While in the hospital, will manage blood pressure as follows; Adjust home antihypertensive regimen as follows- watched outpt recently off meds after taking for over one year    Will utilize p.r.n. blood pressure medication only if patient's blood pressure greater than 180/110 and he develops symptoms such as worsening chest pain or shortness of breath.    10/31 Needs recheck BP outpt and restart meds for BP if stays up.

## 2023-11-01 NOTE — DISCHARGE INSTRUCTIONS
Continue current medications as prescribed.  Return to emergency department for any worsening or further problems.  Follow up in clinic with primary care provider if symptoms persist.  Tried taking over-the-counter laxative to get your bowels moving.

## 2023-11-01 NOTE — SUBJECTIVE & OBJECTIVE
Past Medical History:   Diagnosis Date    Anxiety and depression     Avascular necrosis of bone of right hip 10/4/2023    Pt has had hip pain for more than 2 years. Pt reports recent fall approximately 2 mos ago. States he was evaluated at [facility]. Instructed to follow up with ORTHO but since has not been able to do so.     COPD (chronic obstructive pulmonary disease) 9/27/2023    Coronary artery disease     GERD (gastroesophageal reflux disease)     Hyperlipidemia     Hypertension        Past Surgical History:   Procedure Laterality Date    HIP REPLACEMENT ARTHROPLASTY Right 10/30/2023    Procedure: ARTHROPLASTY, HIP REPLACEMENT;  Surgeon: Deacon Gu III, MD;  Location: [facility] OR;  Service: Orthopedics;  Laterality: Right;    SKIN GRAFT         Review of patient's allergies indicates:   Allergen Reactions    Shellfish containing products Shortness Of Breath and Swelling    Castellani paint [phenol]     Nuts [tree nut]        No current facility-administered medications on file prior to encounter.     Current Outpatient Medications on File Prior to Encounter   Medication Sig    calcium acetate,phosphat bind, (PHOSLO) 667 mg capsule Take 667 mg by mouth 3 (three) times daily with meals.     Family History       Problem Relation (Age of Onset)    Arthritis Paternal Grandmother    Cancer Mother, Father, Maternal Aunt, Maternal Grandmother          Tobacco Use    Smoking status: Every Day     Types: Cigarettes    Smokeless tobacco: Never   Substance and Sexual Activity    Alcohol use: Yes    Drug use: Not Currently    Sexual activity: Yes     Review of Systems   Constitutional:  Negative for appetite change, fatigue and fever.   HENT:  Negative for congestion, hearing loss and trouble swallowing.    Respiratory:  Negative for chest tightness, shortness of breath and wheezing.    Cardiovascular:  Negative for chest pain and palpitations.   Gastrointestinal:  Negative for abdominal pain, constipation and nausea.    Genitourinary:  Negative for difficulty urinating and dysuria.   Musculoskeletal:  Positive for arthralgias. Negative for back pain and neck stiffness.   Skin:  Negative for pallor and rash.   Neurological:  Negative for dizziness, speech difficulty and headaches.   Psychiatric/Behavioral:  Negative for confusion and suicidal ideas.      Objective:     Vital Signs (Most Recent):  Temp: 97.8 °F (36.6 °C) (10/31/23 1000)  Pulse: 73 (10/31/23 1000)  Resp: 17 (10/31/23 1000)  BP: (!) 165/85 (10/31/23 1000)  SpO2: (!) 92 % (10/31/23 1000) Vital Signs (24h Range):  Temp:  [97.5 °F (36.4 °C)-97.8 °F (36.6 °C)] 97.8 °F (36.6 °C)  Pulse:  [68-74] 73  Resp:  [17-20] 17  SpO2:  [92 %-97 %] 92 %  BP: (143-175)/(77-85) 165/85     Weight: 71.2 kg (157 lb)  Body mass index is 22.53 kg/m².     Physical Exam  Vitals reviewed.   Constitutional:       General: He is awake. He is not in acute distress.     Appearance: He is well-developed. He is not toxic-appearing.   HENT:      Head: Normocephalic.      Comments: Large cyst non inflamed posterior scalp     Nose: Nose normal.      Mouth/Throat:      Pharynx: Oropharynx is clear.   Eyes:      Extraocular Movements: Extraocular movements intact.      Pupils: Pupils are equal, round, and reactive to light.   Neck:      Thyroid: No thyroid mass.      Vascular: No carotid bruit.   Cardiovascular:      Rate and Rhythm: Normal rate and regular rhythm.      Pulses: Normal pulses.      Heart sounds: Normal heart sounds. No murmur heard.  Pulmonary:      Effort: Pulmonary effort is normal.      Breath sounds: Normal breath sounds and air entry. No wheezing.   Abdominal:      General: Bowel sounds are normal. There is no distension.      Palpations: Abdomen is soft.      Tenderness: There is no abdominal tenderness.   Musculoskeletal:      Cervical back: Neck supple. No rigidity.      Comments: S/p surgery right hip   Skin:     General: Skin is warm.      Coloration: Skin is not jaundiced.       Findings: No lesion.   Neurological:      General: No focal deficit present.      Mental Status: He is alert and oriented to person, place, and time.      Cranial Nerves: No cranial nerve deficit.   Psychiatric:         Attention and Perception: Attention normal.         Mood and Affect: Mood normal.         Behavior: Behavior normal. Behavior is cooperative.         Thought Content: Thought content normal.         Cognition and Memory: Cognition normal.          Significant Labs: All pertinent labs within the past 24 hours have been reviewed.  BMP:   Recent Labs   Lab 10/31/23  0257   *   *   K 3.8      CO2 25   BUN 11   CREATININE 0.92   CALCIUM 8.2*       CBC:   Recent Labs   Lab 10/30/23  1037 10/31/23  0257   WBC 9.96 11.30*   HGB 12.8* 12.6*   HCT 37.9* 36.2*    298       CMP:   Recent Labs   Lab 10/30/23  1037 10/31/23  0257    135*   K 4.1 3.8    103   CO2 28 25    135*   BUN 12 11   CREATININE 0.84 0.92   CALCIUM 8.3* 8.2*   PROT  --  6.5   ALBUMIN  --  2.7*   BILITOT  --  0.3   ALKPHOS  --  102   AST  --  26   ALT  --  21   ANIONGAP 10 11         Significant Imaging: I have reviewed all pertinent imaging results/findings within the past 24 hours.      Intake/Output - Last 3 Shifts         10/30 0700  10/31 0659 10/31 0700 11/01 0659    P.O. 480     IV Piggyback 500     Total Intake(mL/kg) 980 (13.8)     Urine (mL/kg/hr) 900 (0.5) 300 (0.2)    Drains 350     Blood 100     Total Output 1350 300    Net -370 -300                Microbiology Results (last 7 days)       ** No results found for the last 168 hours. **

## 2023-11-01 NOTE — ED TRIAGE NOTES
PT PRESENTS TO ED VIA EMS, C/O CHEST PAIN SINCE THIS AM. PT RECENTLY HAD HIP FX REPAIR HERE AND DC ON 10/30

## 2023-11-02 LAB
ESTROGEN SERPL-MCNC: NORMAL PG/ML
INSULIN SERPL-ACNC: NORMAL U[IU]/ML
LAB AP GROSS DESCRIPTION: NORMAL
LAB AP LABORATORY NOTES: NORMAL
T3RU NFR SERPL: NORMAL %

## 2023-11-03 ENCOUNTER — TELEPHONE (OUTPATIENT)
Dept: EMERGENCY MEDICINE | Facility: HOSPITAL | Age: 53
End: 2023-11-03
Payer: COMMERCIAL

## 2023-11-08 ENCOUNTER — DOCUMENT SCAN (OUTPATIENT)
Dept: HOME HEALTH SERVICES | Facility: HOSPITAL | Age: 53
End: 2023-11-08
Payer: COMMERCIAL

## 2023-11-08 ENCOUNTER — TELEPHONE (OUTPATIENT)
Dept: ORTHOPEDICS | Facility: CLINIC | Age: 53
End: 2023-11-08
Payer: COMMERCIAL

## 2023-11-08 DIAGNOSIS — M25.551 PAIN OF RIGHT HIP: Primary | ICD-10-CM

## 2023-11-08 NOTE — TELEPHONE ENCOUNTER
Spoke to Colette the OT states Mr. Mckeon fell last night and the request a XR but the patient doesn't have transportation. Sent an order to the office for the m XR

## 2023-11-08 NOTE — TELEPHONE ENCOUNTER
----- Message from Nelda Arias sent at 11/8/2023 11:41 AM CST -----  Regarding: Postop hip replacement  Colette [facility] therapist,called to notify of a fall last night. No bruising or scrapes. Pt is mobile but pain level has increased. He has an appt on Fri morning. Her number is [phone].

## 2023-11-09 ENCOUNTER — TELEPHONE (OUTPATIENT)
Dept: ORTHOPEDICS | Facility: CLINIC | Age: 53
End: 2023-11-09
Payer: COMMERCIAL

## 2023-11-09 NOTE — TELEPHONE ENCOUNTER
SPOKE TO THE OT AGAIN TODAY TRIED TO GET THE PATIENT TO COME TODAY IF THEY ARE WORRIED ABOUT HIS HIP. PATIENT STATES HE CANT GET A RIDE. HE HAS AN APPT ON 11/10

## 2023-11-10 ENCOUNTER — OFFICE VISIT (OUTPATIENT)
Dept: ORTHOPEDICS | Facility: CLINIC | Age: 53
End: 2023-11-10
Payer: COMMERCIAL

## 2023-11-10 ENCOUNTER — HOSPITAL ENCOUNTER (OUTPATIENT)
Dept: RADIOLOGY | Facility: HOSPITAL | Age: 53
Discharge: HOME | End: 2023-11-10
Attending: ORTHOPAEDIC SURGERY
Payer: COMMERCIAL

## 2023-11-10 DIAGNOSIS — M25.551 PAIN OF RIGHT HIP: Primary | ICD-10-CM

## 2023-11-10 DIAGNOSIS — M25.551 PAIN OF RIGHT HIP: ICD-10-CM

## 2023-11-10 PROCEDURE — 73502 XR HIP WITH PELVIS WHEN PERFORMED, 2 OR 3  VIEWS RIGHT: ICD-10-PCS | Mod: 26,RT,, | Performed by: ORTHOPAEDIC SURGERY

## 2023-11-10 PROCEDURE — 1159F PR MEDICATION LIST DOCUMENTED IN MEDICAL RECORD: ICD-10-PCS | Mod: CPTII,,, | Performed by: ORTHOPAEDIC SURGERY

## 2023-11-10 PROCEDURE — 99024 PR POST-OP FOLLOW-UP VISIT: ICD-10-PCS | Mod: ,,, | Performed by: ORTHOPAEDIC SURGERY

## 2023-11-10 PROCEDURE — 73502 X-RAY EXAM HIP UNI 2-3 VIEWS: CPT | Mod: 26,RT,, | Performed by: ORTHOPAEDIC SURGERY

## 2023-11-10 PROCEDURE — 99999PBSHW PR PBB SHADOW TECHNICAL ONLY FILED TO HB: ICD-10-PCS | Mod: PBBFAC,,,

## 2023-11-10 PROCEDURE — 3044F PR MOST RECENT HEMOGLOBIN A1C LEVEL <7.0%: ICD-10-PCS | Mod: CPTII,,, | Performed by: ORTHOPAEDIC SURGERY

## 2023-11-10 PROCEDURE — 99999PBSHW PR PBB SHADOW TECHNICAL ONLY FILED TO HB: Mod: PBBFAC,,,

## 2023-11-10 PROCEDURE — 3044F HG A1C LEVEL LT 7.0%: CPT | Mod: CPTII,,, | Performed by: ORTHOPAEDIC SURGERY

## 2023-11-10 PROCEDURE — 73502 X-RAY EXAM HIP UNI 2-3 VIEWS: CPT | Mod: TC,RT

## 2023-11-10 PROCEDURE — 1159F MED LIST DOCD IN RCRD: CPT | Mod: CPTII,,, | Performed by: ORTHOPAEDIC SURGERY

## 2023-11-10 PROCEDURE — 99024 POSTOP FOLLOW-UP VISIT: CPT | Mod: ,,, | Performed by: ORTHOPAEDIC SURGERY

## 2023-11-10 PROCEDURE — 99213 OFFICE O/P EST LOW 20 MIN: CPT | Mod: PBBFAC | Performed by: ORTHOPAEDIC SURGERY

## 2023-11-10 RX ORDER — OXYCODONE AND ACETAMINOPHEN 5; 325 MG/1; MG/1
1 TABLET ORAL EVERY 8 HOURS PRN
Qty: 20 TABLET | Refills: 0 | Status: SHIPPED | OUTPATIENT
Start: 2023-11-10

## 2023-11-10 NOTE — PROGRESS NOTES
CC:    Chief Complaint   Patient presents with    Follow-up     RT THR 10/30 (11DAYS)           Previos History :        History:  11/10/2023   Juan Mckeon is a 53 y.o.  status post patient fell home health felt like he would need to be x-rayed to his brought in today 11 days out right total hip 10/30/2023        PE:   Incision looks excellent neurovascularly intact      Radiology:  AP of the pelvis AP and lateral the right hip Press-Fit total hip arthroplasty good alignment no evidence of loosening no fracture dislocation appreciated        Ass/Plan:  We will leave the staples in 2 next week prescribed new prescription for Percocet x-ray looks good new dressing Phuongel        Deacon Gu III, MD    Subject to voice recognition errors,  transcription services are not allowed

## 2023-11-13 ENCOUNTER — CLINICAL SUPPORT (OUTPATIENT)
Dept: REHABILITATION | Facility: HOSPITAL | Age: 53
End: 2023-11-13
Payer: COMMERCIAL

## 2023-11-13 ENCOUNTER — DOCUMENT SCAN (OUTPATIENT)
Dept: HOME HEALTH SERVICES | Facility: HOSPITAL | Age: 53
End: 2023-11-13
Payer: COMMERCIAL

## 2023-11-13 DIAGNOSIS — M25.651 STIFFNESS OF RIGHT HIP JOINT: ICD-10-CM

## 2023-11-13 DIAGNOSIS — R53.1 WEAKNESS: ICD-10-CM

## 2023-11-13 DIAGNOSIS — R26.2 DIFFICULTY WALKING: ICD-10-CM

## 2023-11-13 DIAGNOSIS — M25.519 SHOULDER PAIN, UNSPECIFIED CHRONICITY, UNSPECIFIED LATERALITY: ICD-10-CM

## 2023-11-13 DIAGNOSIS — M25.551 PAIN IN JOINT OF RIGHT HIP: Primary | ICD-10-CM

## 2023-11-13 PROCEDURE — 97161 PT EVAL LOW COMPLEX 20 MIN: CPT

## 2023-11-13 NOTE — PLAN OF CARE
[facility]   Physical Therapy Initial Evaluation      Name: Juan Mckeon  Clinic Number: [id number]    Therapy Diagnosis:   Encounter Diagnosis   Name Primary?    Shoulder pain, unspecified chronicity, unspecified laterality         Physician: Deacon Gu III, MD    Physician Orders: PT Eval and Treat  Medical Diagnosis from Referral: s/p R SUMANTH  Evaluation Date: 11/13/2023  Authorization Period Expiration: 10/22/24  Plan of Care Expiration: 12/15/23  Progress Note Due: 12/13/23  Visit # / Visits authorized: 1/ Pending   FOTO: 29 at     Precautions: Standard     Time In: 10:42AM  Time Out: 11:10AM  Total Appointment Time (timed & untimed codes): 28 minutes    Subjective     Date of onset: 10/30/23    History of current condition - [facility] performed by Dr Ian Gu here at [facility] on 10/30/23. 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  Fell off a ladder in August of 2022, which resulted in a hip fracture and necrosis of the hip discovered at that time.      Now 2 weeks out from surgery today and reports that the hip does seem to be getting better.  Still experiencing some pain in lateral aspect of the hip but otherwise feeling pretty good.  Sleeping poorly at this point due to pain, sleeping in the bed at this time.  Ambulating with RW at this point.  Reports more pain when sitting on his tailbone than with standing.  Not back to driving yet. Still taking between 1/2 to 1 pill of percocet at this point.      Lives with wife in single story home, does have two steps to front door.  Hasn't worked in 4 years due to hip pain, has been applying for disability without success.      Falls: Did fall last week when trying to get to the bathroom, but recent x-rays by Dr. Gu showing no damage    Imaging: Recent x-rays showing good alignment of  joint.     Prior Therapy: None  Social History: Lives with wife in single story home  Occupation: Not working  Prior Level of Function: Chronic pain for 7 years now  Current Level of Function: Mod pain; mod I with ambulation    Pain:  Current 6/10, worst 10/10, best 0/10   Location: R tailbone  Description: Throbbing  Aggravating Factors: Sitting and Laying  Easing Factors: pain medication    Patients goals: to be pain free     Medical History:   Past Medical History:   Diagnosis Date    Anxiety and depression     Avascular necrosis of bone of right hip 10/4/2023    Pt has had hip pain for more than 2 years. Pt reports recent fall approximately 2 mos ago. States he was evaluated at [facility]. Instructed to follow up with ORTHO but since has not been able to do so.     COPD (chronic obstructive pulmonary disease) 9/27/2023    Coronary artery disease     GERD (gastroesophageal reflux disease)     Hyperlipidemia     Hypertension        Surgical History:   Juan Mckeon  has a past surgical history that includes Skin graft and Hip replacement arthroplasty (Right, 10/30/2023).    Medications:   Juan has a current medication list which includes the following prescription(s): albuterol, apixaban, calcium acetate(phosphat bind), methylprednisolone, and oxycodone-acetaminophen.    Allergies:   Review of patient's allergies indicates:   Allergen Reactions    Shellfish containing products Shortness Of Breath and Swelling    Castellani paint [phenol]     Nuts [tree nut]         Objective        HIP OBSERVATION  Ectomorphic male w/ fwd flexed posture ambulating with RW and step to gait pattern at evaluation.      Left Lower Extremity  ROM/Strength Right lower Extremity     AROM STRENGTH  AROM STRENGTH   120 4/5 HIP     Flexion (120) 80 2+/5   5 4/5            Extension (15) -5 3-/5   35 4/5            Abduction (45) 20 3-/5   15 4/5            Adduction (25) NT NT   15 3+/5            Internal Rotation (30) NT NT   40 4/5             "External Rotation (50) 20 3-/5     Functional Limitations:  Unable to amb I, unable to negotiate stairs reciprocally, unable to perform a lateral step down on R LE from 4" step, unable to perform bodyweight squat.       Clinical Special Tests:  1. Jasiel test: right Positive left Negative  2. Maurice's test: negative        Limitation/Restriction for FOTO Hip Survey    Therapist reviewed FOTO scores for Juan Mckeon on 11/13/2023.   FOTO documents entered into [employer] - see Media section.    Intake Score: 29%         Treatment     Total Treatment time (time-based codes) separate from Evaluation: 0 billed minutes     Juan received the treatments listed below:  THERAPEUTIC EXERCISES to develop strength, endurance, ROM, flexibility, and posture for 0 billed minutes including jasiel stretch 2x1 min, hip fallout stretch [id number], hooklying clamshells [id number] w/ blue tb, bridges x 15.       Patient Education and Home Exercises     Education provided:   - Diagnosis, prognosis, plan of care forward.     Written Home Exercises Provided: yes. Exercises were reviewed and Juan was able to demonstrate them prior to the end of the session.  Juan demonstrated good  understanding of the education provided. See EMR under Patient Instructions for exercises provided during therapy sessions.    Assessment     Juan is a 53 y.o. male referred to outpatient Physical Therapy s/p R SUMANTH. Patient presents with decreased R hip ROM, strength, flexibility, endurance, functional capacity secondary to recent surgery.  We will work to address the above deficits through skilled therapy at this time.     Patient prognosis is Good.   Patient will benefit from skilled outpatient Physical Therapy to address the deficits stated above and in the chart below, provide patient /family education, and to maximize patientt's level of independence.     Plan of care discussed with patient: Yes  Patient's spiritual, cultural and educational needs considered and " "patient is agreeable to the plan of care and goals as stated below:     Anticipated Barriers for therapy: possible femoral nerve palsy from prior injury    Medical Necessity is demonstrated by the following  History  Co-morbidities and personal factors that may impact the plan of care [] LOW: no personal factors / co-morbidities  [x] MODERATE: 1-2 personal factors / co-morbidities  [] HIGH: 3+ personal factors / co-morbidities    Moderate / High Support Documentation:   Co-morbidities affecting plan of care: possible femoral nerve palsy from prior injury    Personal Factors:   age  lifestyle     Examination  Body Structures and Functions, activity limitations and participation restrictions that may impact the plan of care [] LOW: addressing 1-2 elements  [x] MODERATE: 3+ elements  [] HIGH: 4+ elements (please support below)    Moderate / High Support Documentation: decreased R hip ROM, strength, flexibility, endurance, functional capacity     Clinical Presentation [x] LOW: stable  [] MODERATE: Evolving  [] HIGH: Unstable     Decision Making/ Complexity Score: low       Goals:   Short Term Goals: 2 weeks   Pt will be independent with HEP for continued progression beyond skilled therapy.  Pt will be able to ambulate I without pain or deviation from R hip.  Pt will be able to hold single leg bridge for 5 seconds without increased pain.      Long Term Goals: 4 weeks   Pt will be able to reach and tie shoes without pain or limitation from R hip.   Pt will be able to perform a lateral step down from a 6" step without pain or limitation from R hip.  Pt will be able to lift 45lbs from floor to waist height without pain or limitation from R hip.   Pt will be able to return to all prior ADLs without pain or limitation from R hip.   Plan     Plan of care Certification: 11/13/2023 to 12/15/23.    Outpatient Physical Therapy 2 times weekly for 5 weeks to include the following interventions: Manual Therapy, Neuromuscular Re-ed, " Patient Education, Therapeutic Activities, and Therapeutic Exercise.     Tucker Denton, PT

## 2023-11-15 ENCOUNTER — DOCUMENT SCAN (OUTPATIENT)
Dept: HOME HEALTH SERVICES | Facility: HOSPITAL | Age: 53
End: 2023-11-15
Payer: COMMERCIAL

## 2023-11-15 ENCOUNTER — EXTERNAL HOME HEALTH (OUTPATIENT)
Dept: HOME HEALTH SERVICES | Facility: HOSPITAL | Age: 53
End: 2023-11-15
Payer: COMMERCIAL

## 2023-11-16 ENCOUNTER — TELEPHONE (OUTPATIENT)
Dept: SURGERY | Facility: CLINIC | Age: 53
End: 2023-11-16
Payer: COMMERCIAL

## 2023-11-17 ENCOUNTER — OFFICE VISIT (OUTPATIENT)
Dept: ORTHOPEDICS | Facility: CLINIC | Age: 53
End: 2023-11-17
Payer: COMMERCIAL

## 2023-11-17 DIAGNOSIS — Z09 FOLLOW-UP EXAMINATION, FOLLOWING OTHER SURGERY: Primary | ICD-10-CM

## 2023-11-17 PROCEDURE — 3044F HG A1C LEVEL LT 7.0%: CPT | Mod: CPTII,,, | Performed by: ORTHOPAEDIC SURGERY

## 2023-11-17 PROCEDURE — 99212 OFFICE O/P EST SF 10 MIN: CPT | Mod: PBBFAC | Performed by: ORTHOPAEDIC SURGERY

## 2023-11-17 PROCEDURE — 3044F PR MOST RECENT HEMOGLOBIN A1C LEVEL <7.0%: ICD-10-PCS | Mod: CPTII,,, | Performed by: ORTHOPAEDIC SURGERY

## 2023-11-17 PROCEDURE — 99024 POSTOP FOLLOW-UP VISIT: CPT | Mod: ,,, | Performed by: ORTHOPAEDIC SURGERY

## 2023-11-17 PROCEDURE — 99024 PR POST-OP FOLLOW-UP VISIT: ICD-10-PCS | Mod: ,,, | Performed by: ORTHOPAEDIC SURGERY

## 2023-11-17 NOTE — PROGRESS NOTES
CC:    Chief Complaint   Patient presents with    Follow-up     RECHECK RT THR 10/30 (2WKS)           Previos History :    History:  11/10/2023   Juan Mckeon is a 53 y.o.  status post patient fell home health felt like he would need to be x-rayed to his brought in today 11 days out right total hip 10/30/2023           History:  11/17/2023   Juan Mckeon is a 53 y.o.  status post status post right total hip 10/30/2023 proximally 2 and half weeks ago        PE:   Incision looks good thigh and calf were soft neurovascularly intact      Radiology:  No x-rays today they were done last week Press-Fit total hip        Ass/Plan:  Looks to be doing excellent he is pleased follow-up in a couple of months weight-bearing as tolerated        Deacon Gu III, MD    Subject to voice recognition errors,  transcription services are not allowed

## 2023-11-21 ENCOUNTER — DOCUMENTATION ONLY (OUTPATIENT)
Dept: REHABILITATION | Facility: HOSPITAL | Age: 53
End: 2023-11-21
Payer: COMMERCIAL

## 2023-11-21 NOTE — PROGRESS NOTES
Attempted to call pt at 11AM after he NS 10:45 appt.  Number as inputted is the wrong number from [employer].  Tried it as a 601 number but the number was not in service. /JS

## 2023-11-28 ENCOUNTER — DOCUMENTATION ONLY (OUTPATIENT)
Dept: REHABILITATION | Facility: HOSPITAL | Age: 53
End: 2023-11-28
Payer: COMMERCIAL

## 2023-11-28 NOTE — PROGRESS NOTES
NS for 10:45 appt.  No answer ob his phone.  Wife answered secondary number and said she will ask him to call me

## 2023-11-29 ENCOUNTER — DOCUMENT SCAN (OUTPATIENT)
Dept: HOME HEALTH SERVICES | Facility: HOSPITAL | Age: 53
End: 2023-11-29
Payer: COMMERCIAL

## 2023-11-30 ENCOUNTER — DOCUMENTATION ONLY (OUTPATIENT)
Dept: REHABILITATION | Facility: HOSPITAL | Age: 53
End: 2023-11-30
Payer: COMMERCIAL

## 2023-11-30 NOTE — PROGRESS NOTES
NS fir 3rd straight time today.  Primary number goes straight to voicemail that is not set up and secondary number was not answered.  Will DC for non compliance.

## 2024-01-03 ENCOUNTER — OFFICE VISIT (OUTPATIENT)
Dept: FAMILY MEDICINE | Facility: CLINIC | Age: 54
End: 2024-01-03
Payer: COMMERCIAL

## 2024-01-03 VITALS
RESPIRATION RATE: 18 BRPM | SYSTOLIC BLOOD PRESSURE: 142 MMHG | DIASTOLIC BLOOD PRESSURE: 92 MMHG | HEIGHT: 70 IN | WEIGHT: 159.19 LBS | BODY MASS INDEX: 22.79 KG/M2 | HEART RATE: 73 BPM | OXYGEN SATURATION: 100 %

## 2024-01-03 DIAGNOSIS — K21.9 GASTROESOPHAGEAL REFLUX DISEASE, UNSPECIFIED WHETHER ESOPHAGITIS PRESENT: ICD-10-CM

## 2024-01-03 DIAGNOSIS — J44.9 CHRONIC OBSTRUCTIVE PULMONARY DISEASE, UNSPECIFIED COPD TYPE: ICD-10-CM

## 2024-01-03 DIAGNOSIS — M54.30 SCIATICA, UNSPECIFIED LATERALITY: Primary | ICD-10-CM

## 2024-01-03 PROCEDURE — 99213 OFFICE O/P EST LOW 20 MIN: CPT | Mod: 25,GC,, | Performed by: SPECIALIST

## 2024-01-03 PROCEDURE — 3075F SYST BP GE 130 - 139MM HG: CPT | Mod: CPTII,,, | Performed by: SPECIALIST

## 2024-01-03 PROCEDURE — 96372 THER/PROPH/DIAG INJ SC/IM: CPT | Mod: GC,,, | Performed by: SPECIALIST

## 2024-01-03 PROCEDURE — 3008F BODY MASS INDEX DOCD: CPT | Mod: CPTII,,, | Performed by: SPECIALIST

## 2024-01-03 PROCEDURE — 3080F DIAST BP >= 90 MM HG: CPT | Mod: CPTII,,, | Performed by: SPECIALIST

## 2024-01-03 RX ORDER — CALCIUM ACETATE 667 MG/1
667 CAPSULE ORAL
Qty: 90 CAPSULE | Refills: 5 | Status: SHIPPED | OUTPATIENT
Start: 2024-01-03 | End: 2024-07-01

## 2024-01-03 RX ORDER — PREDNISONE 20 MG/1
20 TABLET ORAL DAILY
Qty: 5 TABLET | Refills: 0 | Status: SHIPPED | OUTPATIENT
Start: 2024-01-03 | End: 2024-01-08

## 2024-01-03 RX ORDER — DEXAMETHASONE SODIUM PHOSPHATE 4 MG/ML
4 INJECTION, SOLUTION INTRA-ARTICULAR; INTRALESIONAL; INTRAMUSCULAR; INTRAVENOUS; SOFT TISSUE
Status: COMPLETED | OUTPATIENT
Start: 2024-01-03 | End: 2024-01-03

## 2024-01-03 RX ORDER — ALBUTEROL SULFATE 90 UG/1
1-2 AEROSOL, METERED RESPIRATORY (INHALATION) EVERY 6 HOURS PRN
Qty: 8 G | Refills: 5 | Status: SHIPPED | OUTPATIENT
Start: 2024-01-03 | End: 2024-01-23

## 2024-01-03 RX ORDER — CYCLOBENZAPRINE HCL 10 MG
10 TABLET ORAL 3 TIMES DAILY
Qty: 60 TABLET | Refills: 0 | Status: SHIPPED | OUTPATIENT
Start: 2024-01-03 | End: 2024-01-23

## 2024-01-03 RX ADMIN — DEXAMETHASONE SODIUM PHOSPHATE 4 MG: 4 INJECTION, SOLUTION INTRA-ARTICULAR; INTRALESIONAL; INTRAMUSCULAR; INTRAVENOUS; SOFT TISSUE at 03:01

## 2024-01-05 NOTE — PROGRESS NOTES
Subjective     Patient ID: Juan Mckeon is a 53 y.o. male.    Chief Complaint: hip replacement (Pt. In room 3 still having extreme pain in right hip following R. Hip replacement done on 10/30/2023.), Medication Refill (Needs refills on all meds./), and Erectile Dysfunction (Stated he has had this problem since the surgery R. Hip replacement surgery./)    53 year old male presented with shooting pain from right buttock that radiates down to the leg. Patient had a recent hip replacement at the right side and unfortunately did not attend his physical therapy after the surgery.   Patient reports decrease range of motion in the right hip.       Review of Systems   All other systems reviewed and are negative.         Objective     Physical Exam  Vitals reviewed.   Constitutional:       Appearance: Normal appearance.   HENT:      Head: Normocephalic and atraumatic.      Right Ear: External ear normal.      Left Ear: External ear normal.      Mouth/Throat:      Mouth: Mucous membranes are moist.      Pharynx: Oropharynx is clear.   Eyes:      Extraocular Movements: Extraocular movements intact.      Pupils: Pupils are equal, round, and reactive to light.   Cardiovascular:      Rate and Rhythm: Normal rate and regular rhythm.      Heart sounds: Normal heart sounds.   Pulmonary:      Effort: Pulmonary effort is normal.      Breath sounds: Normal breath sounds.   Abdominal:      Palpations: Abdomen is soft.   Musculoskeletal:      Cervical back: Neck supple.      Comments: Decreased ROM Right hip. Mild tenderness right hip lateral   Skin:     General: Skin is warm and dry.      Capillary Refill: Capillary refill takes less than 2 seconds.   Neurological:      Mental Status: He is alert and oriented to person, place, and time.   Psychiatric:         Mood and Affect: Mood normal.         Behavior: Behavior normal.          Assessment and Plan     1. Sciatica, unspecified laterality  -     dexAMETHasone injection 4 mg  -      predniSONE (DELTASONE) 20 MG tablet; Take 1 tablet (20 mg total) by mouth once daily. for 5 days  Dispense: 5 tablet; Refill: 0  -     Ambulatory referral/consult to Physical/Occupational Therapy; Future; Expected date: 01/10/2024  -     cyclobenzaprine (FLEXERIL) 10 MG tablet; Take 1 tablet (10 mg total) by mouth 3 (three) times daily. for 20 days  Dispense: 60 tablet; Refill: 0    2. Chronic obstructive pulmonary disease, unspecified COPD type  -     Discontinue: albuterol (PROVENTIL/VENTOLIN HFA) 90 mcg/actuation inhaler; Inhale 1-2 puffs into the lungs every 6 (six) hours as needed for Wheezing. Rescue (Patient not taking: Reported on 1/23/2024)  Dispense: 8 g; Refill: 5    3. Gastroesophageal reflux disease, unspecified whether esophagitis present  -     calcium acetate,phosphat bind, (PHOSLO) 667 mg capsule; Take 1 capsule (667 mg total) by mouth 3 (three) times daily with meals. (Patient not taking: Reported on 1/23/2024)  Dispense: 90 capsule; Refill: 5        Sciatica, unspecified laterality  -     dexAMETHasone injection 4 mg  -     predniSONE (DELTASONE) 20 MG tablet; Take 1 tablet (20 mg total) by mouth once daily. for 5 days  Dispense: 5 tablet; Refill: 0  -     Ambulatory referral/consult to Physical/Occupational Therapy; Future; Expected date: 01/10/2024  -     cyclobenzaprine (FLEXERIL) 10 MG tablet; Take 1 tablet (10 mg total) by mouth 3 (three) times daily. for 20 days  Dispense: 60 tablet; Refill: 0    Chronic obstructive pulmonary disease, unspecified COPD type  -     Discontinue: albuterol (PROVENTIL/VENTOLIN HFA) 90 mcg/actuation inhaler; Inhale 1-2 puffs into the lungs every 6 (six) hours as needed for Wheezing. Rescue (Patient not taking: Reported on 1/23/2024)  Dispense: 8 g; Refill: 5    Gastroesophageal reflux disease, unspecified whether esophagitis present  -     calcium acetate,phosphat bind, (PHOSLO) 667 mg capsule; Take 1 capsule (667 mg total) by mouth 3 (three) times daily with  meals. (Patient not taking: Reported on 1/23/2024)  Dispense: 90 capsule; Refill: 5               No follow-ups on file.

## 2024-01-10 ENCOUNTER — OFFICE VISIT (OUTPATIENT)
Dept: FAMILY MEDICINE | Facility: CLINIC | Age: 54
End: 2024-01-10
Payer: COMMERCIAL

## 2024-01-10 VITALS
DIASTOLIC BLOOD PRESSURE: 85 MMHG | TEMPERATURE: 98 F | WEIGHT: 159 LBS | HEART RATE: 99 BPM | HEIGHT: 70 IN | OXYGEN SATURATION: 99 % | BODY MASS INDEX: 22.76 KG/M2 | SYSTOLIC BLOOD PRESSURE: 135 MMHG | RESPIRATION RATE: 20 BRPM

## 2024-01-10 DIAGNOSIS — R22.0 MASS OF HEAD: Primary | ICD-10-CM

## 2024-01-10 PROCEDURE — 3079F DIAST BP 80-89 MM HG: CPT | Mod: CPTII,,, | Performed by: SPECIALIST

## 2024-01-10 PROCEDURE — 3008F BODY MASS INDEX DOCD: CPT | Mod: CPTII,,, | Performed by: SPECIALIST

## 2024-01-10 PROCEDURE — 3075F SYST BP GE 130 - 139MM HG: CPT | Mod: CPTII,,, | Performed by: SPECIALIST

## 2024-01-10 PROCEDURE — 99212 OFFICE O/P EST SF 10 MIN: CPT | Mod: GC,,, | Performed by: SPECIALIST

## 2024-01-23 ENCOUNTER — OFFICE VISIT (OUTPATIENT)
Dept: FAMILY MEDICINE | Facility: CLINIC | Age: 54
End: 2024-01-23
Payer: COMMERCIAL

## 2024-01-23 VITALS
HEIGHT: 70 IN | OXYGEN SATURATION: 98 % | RESPIRATION RATE: 16 BRPM | WEIGHT: 159 LBS | BODY MASS INDEX: 22.76 KG/M2 | DIASTOLIC BLOOD PRESSURE: 63 MMHG | TEMPERATURE: 98 F | HEART RATE: 100 BPM | SYSTOLIC BLOOD PRESSURE: 121 MMHG

## 2024-01-23 DIAGNOSIS — J44.1 COPD EXACERBATION: Primary | ICD-10-CM

## 2024-01-23 PROCEDURE — 3074F SYST BP LT 130 MM HG: CPT | Mod: CPTII,,, | Performed by: FAMILY MEDICINE

## 2024-01-23 PROCEDURE — 3008F BODY MASS INDEX DOCD: CPT | Mod: CPTII,,, | Performed by: FAMILY MEDICINE

## 2024-01-23 PROCEDURE — 1159F MED LIST DOCD IN RCRD: CPT | Mod: CPTII,,, | Performed by: FAMILY MEDICINE

## 2024-01-23 PROCEDURE — 3078F DIAST BP <80 MM HG: CPT | Mod: CPTII,,, | Performed by: FAMILY MEDICINE

## 2024-01-23 PROCEDURE — 99213 OFFICE O/P EST LOW 20 MIN: CPT | Mod: GC,,, | Performed by: FAMILY MEDICINE

## 2024-01-23 RX ORDER — DOXYCYCLINE 100 MG/1
100 CAPSULE ORAL 2 TIMES DAILY
Qty: 14 CAPSULE | Refills: 0 | Status: SHIPPED | OUTPATIENT
Start: 2024-01-23 | End: 2024-01-30

## 2024-01-23 RX ORDER — PREDNISONE 20 MG/1
40 TABLET ORAL DAILY
Qty: 10 TABLET | Refills: 0 | Status: SHIPPED | OUTPATIENT
Start: 2024-01-23 | End: 2024-01-28

## 2024-01-23 RX ORDER — METAXALONE 800 MG/1
800 TABLET ORAL 3 TIMES DAILY
COMMUNITY
Start: 2023-10-31

## 2024-01-23 RX ORDER — ALBUTEROL SULFATE 90 UG/1
2 AEROSOL, METERED RESPIRATORY (INHALATION) EVERY 6 HOURS PRN
Qty: 18 G | Refills: 2 | Status: SHIPPED | OUTPATIENT
Start: 2024-01-23 | End: 2025-01-22

## 2024-01-23 RX ORDER — POLYETHYLENE GLYCOL-3350 AND ELECTROLYTES 236; 6.74; 5.86; 2.97; 22.74 G/274.31G; G/274.31G; G/274.31G; G/274.31G; G/274.31G
POWDER, FOR SOLUTION ORAL
COMMUNITY
Start: 2023-10-06

## 2024-01-23 NOTE — PROGRESS NOTES
Subjective:       Patient ID: Juan Mckeon is a 53 y.o. male.    Chief Complaint: Wheezing (NEEDS TO  INHALER AT PHARMACY) and Cough (X COUPLE OF DAYS, PRODUCTIVE)    The patient is a 52yo male with a pmh of COPD (not om home O2) who presented to the clinic with c/o a three day history of a productive cough, SOB worse than his baseline and feeling fatigued. The patient says that his wife has been having similar symptoms. He says that he is no longer using any form of tobacco.           Current Outpatient Medications:     cyclobenzaprine (FLEXERIL) 10 MG tablet, Take 1 tablet (10 mg total) by mouth 3 (three) times daily. for 20 days, Disp: 60 tablet, Rfl: 0    albuterol (VENTOLIN HFA) 90 mcg/actuation inhaler, Inhale 2 puffs into the lungs every 6 (six) hours as needed for Wheezing. Rescue, Disp: 18 g, Rfl: 2    apixaban (ELIQUIS) 2.5 mg Tab, Take 1 tablet (2.5 mg total) by mouth 2 (two) times daily. (Patient not taking: Reported on 1/23/2024), Disp: 56 tablet, Rfl: 0    calcium acetate,phosphat bind, (PHOSLO) 667 mg capsule, Take 1 capsule (667 mg total) by mouth 3 (three) times daily with meals. (Patient not taking: Reported on 1/23/2024), Disp: 90 capsule, Rfl: 5    doxycycline (VIBRAMYCIN) 100 MG Cap, Take 1 capsule (100 mg total) by mouth 2 (two) times daily. for 7 days, Disp: 14 capsule, Rfl: 0    GAVILYTE-G 236-22.74-6.74 -5.86 gram suspension, , Disp: , Rfl:     metaxalone (SKELAXIN) 800 MG tablet, Take 800 mg by mouth 3 (three) times daily., Disp: , Rfl:     oxyCODONE-acetaminophen (PERCOCET) 5-325 mg per tablet, Take 1 tablet by mouth every 8 (eight) hours as needed for Pain. (Patient not taking: Reported on 1/23/2024), Disp: 20 tablet, Rfl: 0    predniSONE (DELTASONE) 20 MG tablet, Take 2 tablets (40 mg total) by mouth once daily. for 5 days, Disp: 10 tablet, Rfl: 0    Review of patient's allergies indicates:   Allergen Reactions    Shellfish containing products Shortness Of Breath and Swelling     Castellani paint [phenol]     Nuts [tree nut]        Past Medical History:   Diagnosis Date    Anxiety and depression     Avascular necrosis of bone of right hip 10/4/2023    Pt has had hip pain for more than 2 years. Pt reports recent fall approximately 2 mos ago. States he was evaluated at [facility]. Instructed to follow up with ORTHO but since has not been able to do so.     COPD (chronic obstructive pulmonary disease) 9/27/2023    Coronary artery disease     GERD (gastroesophageal reflux disease)     Hyperlipidemia     Hypertension        Past Surgical History:   Procedure Laterality Date    HIP REPLACEMENT ARTHROPLASTY Right 10/30/2023    Procedure: ARTHROPLASTY, HIP REPLACEMENT;  Surgeon: Deacon Gu III, MD;  Location: [facility] OR;  Service: Orthopedics;  Laterality: Right;    SKIN GRAFT         Family History   Problem Relation Age of Onset    Cancer Mother     Cancer Father     Cancer Maternal Aunt     Cancer Maternal Grandmother     Arthritis Paternal Grandmother        Social History     Tobacco Use    Smoking status: Former     Types: Cigarettes    Smokeless tobacco: Never   Substance Use Topics    Alcohol use: Yes    Drug use: Not Currently       Review of Systems   Constitutional:  Negative for diaphoresis, fatigue, fever and unexpected weight change.   HENT:  Negative for rhinorrhea, sinus pressure/congestion and sneezing.    Respiratory:  Positive for cough and shortness of breath. Negative for chest tightness and wheezing.    Cardiovascular:  Negative for chest pain, palpitations and leg swelling.   Gastrointestinal:  Negative for constipation, diarrhea, nausea and vomiting.   Genitourinary:  Negative for difficulty urinating.   Musculoskeletal:  Negative for back pain.   Neurological:  Negative for dizziness, weakness, light-headedness and headaches.   Psychiatric/Behavioral:  Negative for confusion.          Current Medications:   Medication List with Changes/Refills   New Medications     ALBUTEROL (VENTOLIN HFA) 90 MCG/ACTUATION INHALER    Inhale 2 puffs into the lungs every 6 (six) hours as needed for Wheezing. Rescue       Start Date: 1/23/2024 End Date: 1/22/2025    DOXYCYCLINE (VIBRAMYCIN) 100 MG CAP    Take 1 capsule (100 mg total) by mouth 2 (two) times daily. for 7 days       Start Date: 1/23/2024 End Date: 1/30/2024    PREDNISONE (DELTASONE) 20 MG TABLET    Take 2 tablets (40 mg total) by mouth once daily. for 5 days       Start Date: 1/23/2024 End Date: 1/28/2024   Current Medications    APIXABAN (ELIQUIS) 2.5 MG TAB    Take 1 tablet (2.5 mg total) by mouth 2 (two) times daily.       Start Date: 10/30/2023End Date: --    CALCIUM ACETATE,PHOSPHAT BIND, (PHOSLO) 667 MG CAPSULE    Take 1 capsule (667 mg total) by mouth 3 (three) times daily with meals.       Start Date: 1/3/2024  End Date: 7/1/2024    CYCLOBENZAPRINE (FLEXERIL) 10 MG TABLET    Take 1 tablet (10 mg total) by mouth 3 (three) times daily. for 20 days       Start Date: 1/3/2024  End Date: 1/23/2024    GAVILYTE-G 236-22.74-6.74 -5.86 GRAM SUSPENSION           Start Date: 10/6/2023 End Date: --    METAXALONE (SKELAXIN) 800 MG TABLET    Take 800 mg by mouth 3 (three) times daily.       Start Date: 10/31/2023End Date: --    OXYCODONE-ACETAMINOPHEN (PERCOCET) 5-325 MG PER TABLET    Take 1 tablet by mouth every 8 (eight) hours as needed for Pain.       Start Date: 11/10/2023End Date: --   Discontinued Medications    ALBUTEROL (PROVENTIL/VENTOLIN HFA) 90 MCG/ACTUATION INHALER    Inhale 2 puffs into the lungs every 4 (four) hours as needed for Wheezing.       Start Date: 9/27/2023 End Date: 10/27/2023    ALBUTEROL (PROVENTIL/VENTOLIN HFA) 90 MCG/ACTUATION INHALER    Inhale 1-2 puffs into the lungs every 6 (six) hours as needed for Wheezing. Rescue       Start Date: 1/3/2024  End Date: 1/23/2024            Objective:        Vitals:    01/23/24 1451   BP: 121/63   BP Location: Left arm   Patient Position: Sitting   BP Method: Medium  "(Automatic)   Pulse: 100   Resp: 16   Temp: 98 °F (36.7 °C)   TempSrc: Oral   SpO2: 98%   Weight: 72.1 kg (159 lb)   Height: 5' 10" (1.778 m)       Physical Exam  Vitals reviewed.   Constitutional:       Appearance: Normal appearance.   HENT:      Head: Normocephalic and atraumatic.      Right Ear: External ear normal.      Left Ear: External ear normal.      Mouth/Throat:      Mouth: Mucous membranes are moist.      Pharynx: Oropharynx is clear.   Eyes:      Extraocular Movements: Extraocular movements intact.      Pupils: Pupils are equal, round, and reactive to light.   Cardiovascular:      Rate and Rhythm: Normal rate and regular rhythm.      Heart sounds: Normal heart sounds.   Pulmonary:      Effort: Pulmonary effort is normal.      Breath sounds: Wheezing present.   Abdominal:      Palpations: Abdomen is soft.   Musculoskeletal:      Cervical back: Neck supple.   Skin:     General: Skin is warm and dry.      Capillary Refill: Capillary refill takes less than 2 seconds.   Neurological:      Mental Status: He is alert and oriented to person, place, and time.   Psychiatric:         Mood and Affect: Mood normal.         Behavior: Behavior normal.               Lab Results   Component Value Date    WBC 10.29 11/01/2023    HGB 12.6 (L) 11/01/2023    HCT 36.4 (L) 11/01/2023     11/01/2023    CHOL 209 (H) 09/27/2023    TRIG 117 09/27/2023    HDL 75 (H) 09/27/2023    ALT 64 (H) 11/01/2023    AST 59 (H) 11/01/2023     (L) 11/01/2023    K 3.7 11/01/2023    CL 99 11/01/2023    CREATININE 0.94 11/01/2023    BUN 8 11/01/2023    CO2 28 11/01/2023    TSH 0.308 (L) 10/31/2023    INR 0.88 10/23/2023    HGBA1C 4.9 09/27/2023      Assessment:       1. COPD exacerbation        Plan:         Problem List Items Addressed This Visit    None  Visit Diagnoses       COPD exacerbation    -  Primary    Prednisone x5 days  Doxycycline x7 days  Albuterol inhaler prn   Return if symptoms worsen/do not improve    Relevant " Medications    predniSONE (DELTASONE) 20 MG tablet    doxycycline (VIBRAMYCIN) 100 MG Cap    albuterol (VENTOLIN HFA) 90 mcg/actuation inhaler              Follow up in about 3 months (around 4/23/2024) for visit with PCP for regular check up.    Flor Henry MD     Instructed patient that if symptoms fail to improve or worsen patient should seek immediate medical attention or report to the nearest emergency department. Patient expressed verbal agreement and understanding to this plan of care.

## 2024-01-24 DIAGNOSIS — Z09 FOLLOW-UP EXAMINATION, FOLLOWING OTHER SURGERY: Primary | ICD-10-CM

## 2024-02-02 PROBLEM — M54.30 SCIATICA: Status: ACTIVE | Noted: 2024-02-02

## 2024-02-02 PROBLEM — R22.0 MASS OF HEAD: Status: ACTIVE | Noted: 2024-02-02

## 2024-02-02 NOTE — ASSESSMENT & PLAN NOTE
Most likely a lipoma  Patient was told he would need to follow up with his orthopedic surgeron and get of off eliquis prior to coming back to remove the mass.

## 2024-02-02 NOTE — PROGRESS NOTES
Subjective     Patient ID: Juan Mckeon is a 53 y.o. male.    Chief Complaint: Follow-up (Room 5 f/u medicine)    Patient presented to clinic for possible removal of a pass in the posterior area of the head and neck. Mass could be a lipoma and it is around 7-8 cm in diameter. Patient takes eliquis that was started by his orthopedic surgeon post hip replament for DVT prophylaxis. It has been almost 2 months since the surgery. It is unclear why he is still on eliquis.     Review of Systems   All other systems reviewed and are negative.         Objective     Physical Exam  Vitals reviewed.   Constitutional:       Appearance: Normal appearance.   HENT:      Head: Normocephalic and atraumatic.      Right Ear: External ear normal.      Left Ear: External ear normal.      Mouth/Throat:      Mouth: Mucous membranes are moist.      Pharynx: Oropharynx is clear.   Eyes:      Extraocular Movements: Extraocular movements intact.      Pupils: Pupils are equal, round, and reactive to light.   Cardiovascular:      Rate and Rhythm: Normal rate and regular rhythm.      Heart sounds: Normal heart sounds.   Pulmonary:      Effort: Pulmonary effort is normal.      Breath sounds: Normal breath sounds.   Abdominal:      Palpations: Abdomen is soft.   Musculoskeletal:      Cervical back: Neck supple.      Comments: Decreased ROM Right hip. Mild tenderness right hip lateral   Skin:     General: Skin is warm and dry.      Capillary Refill: Capillary refill takes less than 2 seconds.   Neurological:      Mental Status: He is alert and oriented to person, place, and time.   Psychiatric:         Mood and Affect: Mood normal.         Behavior: Behavior normal.          Assessment and Plan     1. Mass of head  Assessment & Plan:  Most likely a lipoma  Patient was told he would need to follow up with his orthopedic surgeron and get of off eliquis prior to coming back to remove the mass.           Mass of head               No follow-ups on  file.

## 2024-02-07 DIAGNOSIS — M25.551 PAIN OF RIGHT HIP: Primary | ICD-10-CM

## 2024-02-07 DIAGNOSIS — Z09 FOLLOW-UP EXAMINATION, FOLLOWING OTHER SURGERY: ICD-10-CM

## 2024-02-08 ENCOUNTER — HOSPITAL ENCOUNTER (OUTPATIENT)
Dept: RADIOLOGY | Facility: HOSPITAL | Age: 54
Discharge: HOME | End: 2024-02-08
Attending: ORTHOPAEDIC SURGERY
Payer: COMMERCIAL

## 2024-02-08 ENCOUNTER — OFFICE VISIT (OUTPATIENT)
Dept: ORTHOPEDICS | Facility: CLINIC | Age: 54
End: 2024-02-08
Payer: COMMERCIAL

## 2024-02-08 DIAGNOSIS — Z09 FOLLOW-UP EXAMINATION, FOLLOWING OTHER SURGERY: ICD-10-CM

## 2024-02-08 DIAGNOSIS — Z96.641 H/O TOTAL HIP ARTHROPLASTY, RIGHT: ICD-10-CM

## 2024-02-08 DIAGNOSIS — Z09 FOLLOW-UP EXAMINATION, FOLLOWING OTHER SURGERY: Primary | ICD-10-CM

## 2024-02-08 PROCEDURE — 99213 OFFICE O/P EST LOW 20 MIN: CPT | Mod: PBBFAC,25 | Performed by: ORTHOPAEDIC SURGERY

## 2024-02-08 PROCEDURE — 99212 OFFICE O/P EST SF 10 MIN: CPT | Mod: S$PBB,,, | Performed by: ORTHOPAEDIC SURGERY

## 2024-02-08 PROCEDURE — 73502 X-RAY EXAM HIP UNI 2-3 VIEWS: CPT | Mod: 26,RT,, | Performed by: ORTHOPAEDIC SURGERY

## 2024-02-08 PROCEDURE — 73502 X-RAY EXAM HIP UNI 2-3 VIEWS: CPT | Mod: TC,RT

## 2024-02-08 PROCEDURE — 1159F MED LIST DOCD IN RCRD: CPT | Mod: CPTII,,, | Performed by: ORTHOPAEDIC SURGERY

## 2024-02-08 NOTE — PROGRESS NOTES
CC:   Chief Complaint   Patient presents with    Follow-up     RECHECK RT THR 10/30- 3 MONTHS         PREVIOUS INFO:        HISTORY:   2/8/2024    Juan Mckeon  is a 53 y.o. comes in using 2 crutches but when he puts him 90 walk pretty good he has some lateral hip pain no injury he asked about a blood thinner that they want to stop prior to doing excision of the mass on the back of his head told him from my standpoint I do not have him on any blood thinners somebody else may but I do not      PAST MEDICAL HISTORY:   Past Medical History:   Diagnosis Date    Anxiety and depression     Avascular necrosis of bone of right hip 10/4/2023    Pt has had hip pain for more than 2 years. Pt reports recent fall approximately 2 mos ago. States he was evaluated at [facility]. Instructed to follow up with ORTHO but since has not been able to do so.     COPD (chronic obstructive pulmonary disease) 9/27/2023    Coronary artery disease     GERD (gastroesophageal reflux disease)     Hyperlipidemia     Hypertension           PAST SURGICAL HISTORY:   Past Surgical History:   Procedure Laterality Date    HIP REPLACEMENT ARTHROPLASTY Right 10/30/2023    Procedure: ARTHROPLASTY, HIP REPLACEMENT;  Surgeon: Deacon Gu III, MD;  Location: [facility] OR;  Service: Orthopedics;  Laterality: Right;    SKIN GRAFT            ALLERGIES:   Review of patient's allergies indicates:   Allergen Reactions    Shellfish containing products Shortness Of Breath and Swelling    Castellani paint [phenol]     Nuts [tree nut]         MEDICATIONS :    Current Outpatient Medications:     albuterol (VENTOLIN HFA) 90 mcg/actuation inhaler, Inhale 2 puffs into the lungs every 6 (six) hours as needed for Wheezing. Rescue, Disp: 18 g, Rfl: 2    apixaban (ELIQUIS) 2.5 mg Tab, Take 1 tablet (2.5 mg total) by mouth 2 (two) times daily. (Patient not taking: Reported on 1/23/2024), Disp: 56 tablet, Rfl: 0    calcium acetate,phosphat bind, (PHOSLO) 667 mg  capsule, Take 1 capsule (667 mg total) by mouth 3 (three) times daily with meals. (Patient not taking: Reported on 1/23/2024), Disp: 90 capsule, Rfl: 5    GAVILYTE-G 236-22.74-6.74 -5.86 gram suspension, , Disp: , Rfl:     metaxalone (SKELAXIN) 800 MG tablet, Take 800 mg by mouth 3 (three) times daily., Disp: , Rfl:     oxyCODONE-acetaminophen (PERCOCET) 5-325 mg per tablet, Take 1 tablet by mouth every 8 (eight) hours as needed for Pain. (Patient not taking: Reported on 1/23/2024), Disp: 20 tablet, Rfl: 0     SOCIAL HISTORY:   Social History     Socioeconomic History    Marital status:    Tobacco Use    Smoking status: Former     Types: Cigarettes    Smokeless tobacco: Never   Substance and Sexual Activity    Alcohol use: Yes    Drug use: Not Currently    Sexual activity: Yes     Social Determinants of Health     Financial Resource Strain: Low Risk  (10/30/2023)    Overall Financial Resource Strain (CARDIA)     Difficulty of Paying Living Expenses: Not hard at all   Food Insecurity: No Food Insecurity (10/30/2023)    Hunger Vital Sign     Worried About Running Out of Food in the Last Year: Never true     Ran Out of Food in the Last Year: Never true   Transportation Needs: No Transportation Needs (10/30/2023)    PRAPARE - Transportation     Lack of Transportation (Medical): No     Lack of Transportation (Non-Medical): No   Physical Activity: Inactive (10/30/2023)    Exercise Vital Sign     Days of Exercise per Week: 0 days     Minutes of Exercise per Session: 0 min   Stress: Stress Concern Present (10/30/2023)    [employer] - Occupational Stress Questionnaire     Feeling of Stress : Very much   Social Connections: Moderately Integrated (10/30/2023)    Social Connection and Isolation Panel [NHANES]     Frequency of Communication with Friends and Family: Three times a week     Frequency of Social Gatherings with Friends and Family: Three times a week     Attends Islam  Services: More than 4 times per year     Active Member of Clubs or Organizations: No     Attends Club or Organization Meetings: Never     Marital Status: Living with partner   Housing Stability: Low Risk  (10/30/2023)    Housing Stability Vital Sign     Unable to Pay for Housing in the Last Year: No     Number of Places Lived in the Last Year: 1     Unstable Housing in the Last Year: No        ROS    FAMILY HISTORY:   Family History   Problem Relation Age of Onset    Cancer Mother     Cancer Father     Cancer Maternal Aunt     Cancer Maternal Grandmother     Arthritis Paternal Grandmother           PHYSICAL EXAM: There were no vitals filed for this visit.            There is no height or weight on file to calculate BMI.     In general, this is a well-developed, well-nourished male . The patient is alert, oriented and cooperative.      HEENT:  Normocephalic, atraumatic.  Extraocular movements are intact bilaterally.  The oropharynx is benign.       NECK:  Nontender with good range of motion.      PULMONARY: Respirations are even and non-labored.       CARDIOVASCULAR: Pulses regular by peripheral palpation.       ABDOMEN:  Soft, non-tender, non-distended.        EXTREMITIES:  You can flex his hip up to 90° he has 40° external rotation about 10° external rotation he does have some tenderness over the greater troch region    Ortho Exam      RADIOGRAPHIC FINDINGS:  AP of the pelvis AP and lateral the right hip Press-Fit total hip prosthesis acetabulum femoral components heterotopic ossification seen in the greater troch on the lateral view no fracture dislocation appreciated prosthesis well seated      .      IMPRESSION:  Status post total hip arthroplasty    PLAN:  Told him gait definitely did not use his crutches in put weight on it I do not have him on blood thinners at this point.  There are no Patient Instructions on file for this visit.      No follow-ups on file.         Deacon Gu III      (Subject to voice  recognition error, transcription service not allowed)

## 2024-02-14 ENCOUNTER — OFFICE VISIT (OUTPATIENT)
Dept: FAMILY MEDICINE | Facility: CLINIC | Age: 54
End: 2024-02-14
Payer: COMMERCIAL

## 2024-02-14 VITALS
RESPIRATION RATE: 19 BRPM | HEART RATE: 87 BPM | TEMPERATURE: 98 F | DIASTOLIC BLOOD PRESSURE: 90 MMHG | BODY MASS INDEX: 22.81 KG/M2 | SYSTOLIC BLOOD PRESSURE: 131 MMHG | HEIGHT: 70 IN | OXYGEN SATURATION: 99 %

## 2024-02-14 DIAGNOSIS — L03.90 CELLULITIS, UNSPECIFIED CELLULITIS SITE: Primary | ICD-10-CM

## 2024-02-14 DIAGNOSIS — R22.0 MASS OF HEAD: ICD-10-CM

## 2024-02-14 DIAGNOSIS — R22.1 MASS IN NECK: ICD-10-CM

## 2024-02-14 PROCEDURE — 3008F BODY MASS INDEX DOCD: CPT | Mod: CPTII,,, | Performed by: FAMILY MEDICINE

## 2024-02-14 PROCEDURE — 13122 CMPLX RPR S/A/L ADDL 5 CM/>: CPT | Mod: GC,,, | Performed by: FAMILY MEDICINE

## 2024-02-14 PROCEDURE — 13121 CMPLX RPR S/A/L 2.6-7.5 CM: CPT | Mod: GC,,, | Performed by: FAMILY MEDICINE

## 2024-02-14 PROCEDURE — 3075F SYST BP GE 130 - 139MM HG: CPT | Mod: CPTII,,, | Performed by: FAMILY MEDICINE

## 2024-02-14 PROCEDURE — 11426 EXC H-F-NK-SP B9+MARG >4 CM: CPT | Mod: GC,,, | Performed by: FAMILY MEDICINE

## 2024-02-14 PROCEDURE — 99213 OFFICE O/P EST LOW 20 MIN: CPT | Mod: 25,GC,, | Performed by: FAMILY MEDICINE

## 2024-02-14 PROCEDURE — 3080F DIAST BP >= 90 MM HG: CPT | Mod: CPTII,,, | Performed by: FAMILY MEDICINE

## 2024-02-14 RX ORDER — CEFUROXIME AXETIL 500 MG/1
500 TABLET ORAL EVERY 12 HOURS
Qty: 10 TABLET | Refills: 0 | Status: SHIPPED | OUTPATIENT
Start: 2024-02-14 | End: 2024-02-19

## 2024-02-15 PROCEDURE — 88304 TISSUE EXAM BY PATHOLOGIST: CPT | Mod: 26,,, | Performed by: PATHOLOGY

## 2024-02-15 PROCEDURE — 88304 TISSUE EXAM BY PATHOLOGIST: CPT | Mod: TC,SUR | Performed by: SPECIALIST

## 2024-02-15 NOTE — PROGRESS NOTES
Excision of Lesion    Date/Time: 2/14/2024 2:45 PM    Performed by: Brian Alejandro MD  Authorized by: Collins Brooks MD    Consent Done?:  Yes (Verbal) and Yes (Written)  Prep: patient was prepped and draped in usual sterile fashion    Local anesthesia used?: Yes    Anesthesia:  Local infiltration  Local anesthetic:  Lidocaine 1% without epinephrine  Assistants?: Yes     I was present for the entire procedure.  Indications:  Lipoma  Body area:  Scalp  Anesthesia:  Local infiltration  Local anesthetic:  Lidocaine 1% without epinephrine  Excision type:  Tumor  Excision depth:  Subfascial  Excision size (cm):  10  Specimens?: Yes     Specimens submitted to pathology.  Wound closure:  Complex layered   Patient tolerated the procedure well with no immediate complications.   Post-operative instructions were provided for the patient.   Patient was discharged and will follow up for wound check and pathology results.

## 2024-02-16 ENCOUNTER — OFFICE VISIT (OUTPATIENT)
Dept: FAMILY MEDICINE | Facility: CLINIC | Age: 54
End: 2024-02-16
Payer: COMMERCIAL

## 2024-02-16 VITALS
HEIGHT: 72 IN | WEIGHT: 162 LBS | SYSTOLIC BLOOD PRESSURE: 131 MMHG | TEMPERATURE: 98 F | OXYGEN SATURATION: 97 % | HEART RATE: 102 BPM | DIASTOLIC BLOOD PRESSURE: 89 MMHG | RESPIRATION RATE: 18 BRPM | BODY MASS INDEX: 21.94 KG/M2

## 2024-02-16 DIAGNOSIS — R22.0 MASS OF HEAD: Primary | ICD-10-CM

## 2024-02-16 PROCEDURE — 3079F DIAST BP 80-89 MM HG: CPT | Mod: CPTII,,, | Performed by: FAMILY MEDICINE

## 2024-02-16 PROCEDURE — 3075F SYST BP GE 130 - 139MM HG: CPT | Mod: CPTII,,, | Performed by: FAMILY MEDICINE

## 2024-02-16 PROCEDURE — 99212 OFFICE O/P EST SF 10 MIN: CPT | Mod: ,,, | Performed by: FAMILY MEDICINE

## 2024-02-16 PROCEDURE — 3008F BODY MASS INDEX DOCD: CPT | Mod: CPTII,,, | Performed by: FAMILY MEDICINE

## 2024-02-16 PROCEDURE — 1159F MED LIST DOCD IN RCRD: CPT | Mod: CPTII,,, | Performed by: FAMILY MEDICINE

## 2024-02-16 NOTE — PROGRESS NOTES
Subjective:       Patient ID: Juan cMkeon is a 53 y.o. male.    Chief Complaint: Follow-up    Patient presented to clinic for follow up regarding the removal of a mass in the posterior area of the head and neck. Mass was removed Wednesday and was around 7-8 cm in diameter.  Patient reports no issues at this time.       Current Outpatient Medications:     albuterol (VENTOLIN HFA) 90 mcg/actuation inhaler, Inhale 2 puffs into the lungs every 6 (six) hours as needed for Wheezing. Rescue, Disp: 18 g, Rfl: 2    calcium acetate,phosphat bind, (PHOSLO) 667 mg capsule, Take 1 capsule (667 mg total) by mouth 3 (three) times daily with meals., Disp: 90 capsule, Rfl: 5    apixaban (ELIQUIS) 2.5 mg Tab, Take 1 tablet (2.5 mg total) by mouth 2 (two) times daily. (Patient not taking: Reported on 1/23/2024), Disp: 56 tablet, Rfl: 0    cefUROXime (CEFTIN) 500 MG tablet, Take 1 tablet (500 mg total) by mouth every 12 (twelve) hours. for 5 days (Patient not taking: Reported on 2/16/2024), Disp: 10 tablet, Rfl: 0    GAVILYTE-G 236-22.74-6.74 -5.86 gram suspension, , Disp: , Rfl:     metaxalone (SKELAXIN) 800 MG tablet, Take 800 mg by mouth 3 (three) times daily., Disp: , Rfl:     oxyCODONE-acetaminophen (PERCOCET) 5-325 mg per tablet, Take 1 tablet by mouth every 8 (eight) hours as needed for Pain., Disp: 20 tablet, Rfl: 0    Review of patient's allergies indicates:   Allergen Reactions    Shellfish containing products Shortness Of Breath and Swelling    Castellani paint [phenol]     Nuts [tree nut]        Past Medical History:   Diagnosis Date    Anxiety and depression     Avascular necrosis of bone of right hip 10/4/2023    Pt has had hip pain for more than 2 years. Pt reports recent fall approximately 2 mos ago. States he was evaluated at [facility]. Instructed to follow up with ORTHO but since has not been able to do so.     COPD (chronic obstructive pulmonary disease) 9/27/2023    Coronary artery disease     GERD (gastroesophageal  reflux disease)     Hyperlipidemia     Hypertension        Past Surgical History:   Procedure Laterality Date    HIP REPLACEMENT ARTHROPLASTY Right 10/30/2023    Procedure: ARTHROPLASTY, HIP REPLACEMENT;  Surgeon: Deacon Gu III, MD;  Location: [facility];  Service: Orthopedics;  Laterality: Right;    SKIN GRAFT         Family History   Problem Relation Age of Onset    Cancer Mother     Cancer Father     Cancer Maternal Aunt     Cancer Maternal Grandmother     Arthritis Paternal Grandmother        Social History     Tobacco Use    Smoking status: Former     Types: Cigarettes    Smokeless tobacco: Never   Substance Use Topics    Alcohol use: Yes    Drug use: Not Currently       Review of Systems   All other systems reviewed and are negative.      Current Medications:   Medication List with Changes/Refills   Current Medications    ALBUTEROL (VENTOLIN HFA) 90 MCG/ACTUATION INHALER    Inhale 2 puffs into the lungs every 6 (six) hours as needed for Wheezing. Rescue       Start Date: 1/23/2024 End Date: 1/22/2025    APIXABAN (ELIQUIS) 2.5 MG TAB    Take 1 tablet (2.5 mg total) by mouth 2 (two) times daily.       Start Date: 10/30/2023End Date: --    CALCIUM ACETATE,PHOSPHAT BIND, (PHOSLO) 667 MG CAPSULE    Take 1 capsule (667 mg total) by mouth 3 (three) times daily with meals.       Start Date: 1/3/2024  End Date: 7/1/2024    CEFUROXIME (CEFTIN) 500 MG TABLET    Take 1 tablet (500 mg total) by mouth every 12 (twelve) hours. for 5 days       Start Date: 2/14/2024 End Date: 2/19/2024    GAVILYTE-G 236-22.74-6.74 -5.86 GRAM SUSPENSION           Start Date: 10/6/2023 End Date: --    METAXALONE (SKELAXIN) 800 MG TABLET    Take 800 mg by mouth 3 (three) times daily.       Start Date: 10/31/2023End Date: --    OXYCODONE-ACETAMINOPHEN (PERCOCET) 5-325 MG PER TABLET    Take 1 tablet by mouth every 8 (eight) hours as needed for Pain.       Start Date: 11/10/2023End Date: --            Objective:        Vitals:     02/16/24 1431   BP: 131/89   BP Location: Right arm   Patient Position: Sitting   BP Method: Medium (Automatic)   Pulse: 102   Resp: 18   Temp: 97.9 °F (36.6 °C)   TempSrc: Oral   SpO2: 97%   Weight: 73.5 kg (162 lb)   Height: 6' (1.829 m)       Physical Exam  Vitals and nursing note reviewed.   Constitutional:       Appearance: Normal appearance.   HENT:      Head: Normocephalic.        Comments: Sutures in place, appear well approximated, no swelling, erythema noted.      Right Ear: External ear normal.      Left Ear: External ear normal.      Nose: Nose normal.      Mouth/Throat:      Mouth: Mucous membranes are moist.      Pharynx: Oropharynx is clear.   Eyes:      Conjunctiva/sclera: Conjunctivae normal.   Cardiovascular:      Rate and Rhythm: Normal rate and regular rhythm.      Pulses: Normal pulses.      Heart sounds: Normal heart sounds.   Pulmonary:      Effort: Pulmonary effort is normal.      Breath sounds: Normal breath sounds.   Musculoskeletal:      Right lower leg: No edema.      Left lower leg: No edema.   Neurological:      Mental Status: He is alert and oriented to person, place, and time.   Psychiatric:         Mood and Affect: Mood normal.         Behavior: Behavior normal.         Thought Content: Thought content normal.         Judgment: Judgment normal.           Lab Results   Component Value Date    WBC 10.29 11/01/2023    HGB 12.6 (L) 11/01/2023    HCT 36.4 (L) 11/01/2023     11/01/2023    CHOL 209 (H) 09/27/2023    TRIG 117 09/27/2023    HDL 75 (H) 09/27/2023    ALT 64 (H) 11/01/2023    AST 59 (H) 11/01/2023     (L) 11/01/2023    K 3.7 11/01/2023    CL 99 11/01/2023    CREATININE 0.94 11/01/2023    BUN 8 11/01/2023    CO2 28 11/01/2023    TSH 0.308 (L) 10/31/2023    INR 0.88 10/23/2023    HGBA1C 4.9 09/27/2023      Assessment:       1. Mass of head        Plan:         Problem List Items Addressed This Visit          ENT    Mass of head - Primary     Patient had removal of  mass earlier this week, this is a follow up/dressing change. Appears to be healing well, no hematoma noted, no erythema noted as well. Will follow back up Tuesday for additional dressing change.               Follow up in about 1 week (around 2/23/2024).    Alejandro Miller,      Instructed patient that if symptoms fail to improve or worsen patient should seek immediate medical attention or report to the nearest emergency department. Patient expressed verbal agreement and understanding to this plan of care.

## 2024-02-19 LAB
DHEA SERPL-MCNC: NORMAL
ESTROGEN SERPL-MCNC: NORMAL PG/ML
INSULIN SERPL-ACNC: NORMAL U[IU]/ML
LAB AP CLINICAL INFORMATION: NORMAL
LAB AP GROSS DESCRIPTION: NORMAL
LAB AP LABORATORY NOTES: NORMAL
T3RU NFR SERPL: NORMAL %

## 2024-02-21 PROBLEM — R22.1 MASS IN NECK: Status: ACTIVE | Noted: 2024-02-21

## 2024-02-21 NOTE — ASSESSMENT & PLAN NOTE
Surgery was done by Dr. Brooks and myself and the mass was removed fully with no complications  There was around +20 sutures used  Speciment was sent for pathology- results showed lipoma as predicted

## 2024-02-21 NOTE — PROGRESS NOTES
Subjective     Patient ID: Juan Mckeon is a 53 y.o. male.    Chief Complaint: No chief complaint on file.    53 year old male presented with a 13 cm mass in the posterior aspect of the head. Patient is requesting to remove the mass. Mass has been present for 23 years and he is having some discomfort at nights when sleeping due to the size of the mass. The mass seems to be a lipoma.       Review of Systems   All other systems reviewed and are negative.         Objective     Physical Exam  Vitals and nursing note reviewed.   Constitutional:       General: He is not in acute distress.     Appearance: Normal appearance. He is normal weight. He is not ill-appearing or toxic-appearing.   HENT:      Head: Atraumatic.        Right Ear: External ear normal.      Left Ear: External ear normal.      Nose: No congestion or rhinorrhea.      Mouth/Throat:      Mouth: Mucous membranes are moist.   Cardiovascular:      Rate and Rhythm: Normal rate.   Pulmonary:      Effort: Pulmonary effort is normal. No respiratory distress.   Abdominal:      General: Abdomen is flat.   Musculoskeletal:      Cervical back: Neck supple.   Skin:     General: Skin is warm.   Neurological:      Mental Status: He is alert and oriented to person, place, and time.   Psychiatric:         Behavior: Behavior normal.            Assessment and Plan     1. Cellulitis, unspecified cellulitis site  -     cefUROXime (CEFTIN) 500 MG tablet; Take 1 tablet (500 mg total) by mouth every 12 (twelve) hours. for 5 days (Patient not taking: Reported on 2/16/2024)  Dispense: 10 tablet; Refill: 0    2. Mass in neck  -     Surgical Pathology  -     Excision of Lesion    3. Mass of head  Assessment & Plan:  Surgery was done by Dr. Brooks and myself and the mass was removed fully with no complications  There was around +20 sutures used  Speciment was sent for pathology- results showed lipoma as predicted          Cellulitis, unspecified cellulitis site  -     cefUROXime  (CEFTIN) 500 MG tablet; Take 1 tablet (500 mg total) by mouth every 12 (twelve) hours. for 5 days (Patient not taking: Reported on 2/16/2024)  Dispense: 10 tablet; Refill: 0    Mass in neck  -     Surgical Pathology  -     Excision of Lesion    Mass of head               Follow up in about 2 days (around 2/16/2024).

## 2024-02-23 NOTE — PROGRESS NOTES
Agree with documentation of this encounter and associated procedure as Dr. Alejandro has documented:  Procedure Note:  Date time 2/14/2024 2:45 pm-645 pm  Authorized by DR Brooks  Consent done:yes verbal and then written.  Prep: patient was prepped and draped in usual sterile fashion.  Local anesthesia used:yes  Anesthesia used: Lidocaine 1 percent without epinephrine less than 10 ml   Assistants:yes,DR Brooks was present for the entire procedure.  Indications for surgery: growing  lipoma   Body area: scalp  Excision type: Tumor excision  Excision depth: subfascial  Excision size:10 cm  Specimens taken for path:yes submitted to pathology  Wound closure description: Complex layered.  Patient tolerated the procedure well without any immediate complications.  Post operative instructions provided to the patient.Not to submerge the area for 24 hours.Ok for spit bath. Shield surgical site from the sun to avoid darkening of the scar. Report any redness greater than size of half dollar or foul odor or brisk bleeding.  Patient was discharged from the clinic in good and improved condition.   Will follow-up for wound check 1 week and to go over his pathology results.Patient to call if he has not received his my chart results in the next 14 days.

## 2024-08-13 ENCOUNTER — HOSPITAL ENCOUNTER (EMERGENCY)
Facility: HOSPITAL | Age: 54
Discharge: HOME | End: 2024-08-13
Payer: COMMERCIAL

## 2024-08-13 VITALS
HEART RATE: 84 BPM | TEMPERATURE: 98 F | OXYGEN SATURATION: 100 % | WEIGHT: 142 LBS | HEIGHT: 72 IN | SYSTOLIC BLOOD PRESSURE: 150 MMHG | BODY MASS INDEX: 19.23 KG/M2 | DIASTOLIC BLOOD PRESSURE: 89 MMHG | RESPIRATION RATE: 20 BRPM

## 2024-08-13 DIAGNOSIS — U07.1 COVID-19 VIRUS DETECTED: ICD-10-CM

## 2024-08-13 DIAGNOSIS — U07.1 COVID-19: Primary | ICD-10-CM

## 2024-08-13 LAB — SARS-COV-2 RDRP RESP QL NAA+PROBE: POSITIVE

## 2024-08-13 PROCEDURE — 99282 EMERGENCY DEPT VISIT SF MDM: CPT

## 2024-08-13 PROCEDURE — 87635 SARS-COV-2 COVID-19 AMP PRB: CPT | Performed by: NURSE PRACTITIONER

## 2024-08-13 NOTE — ED TRIAGE NOTES
Presents to ED for complaints of nausea, chills, body aches, cough and headache since Friday.  Patient reports that he thinks he was exposed to Covid.

## 2024-08-13 NOTE — ED PROVIDER NOTES
Encounter Date: 8/13/2024       History     Chief Complaint   Patient presents with    Nausea    Chills    Generalized Body Aches    Cough    Headache     53-year-old male presents to the emergency department to be evaluated for nasal congestion, cough and chills that began 4 days ago.  He was recently around someone who tested positive for COVID.    The history is provided by the patient.   URI  The primary symptoms include cough. Primary symptoms do not include fever, fatigue, headaches, ear pain, sore throat, swollen glands, wheezing, abdominal pain, nausea, vomiting, myalgias, arthralgias or rash.   Symptoms associated with the illness include congestion and rhinorrhea.     Review of patient's allergies indicates:   Allergen Reactions    Shellfish containing products Shortness Of Breath and Swelling    Castellani paint [phenol]     Nuts [tree nut]      Past Medical History:   Diagnosis Date    Anxiety and depression     Avascular necrosis of bone of right hip 10/4/2023    Pt has had hip pain for more than 2 years. Pt reports recent fall approximately 2 mos ago. States he was evaluated at [facility]. Instructed to follow up with [facility] but since has not been able to do so.     COPD (chronic obstructive pulmonary disease) 9/27/2023    Coronary artery disease     GERD (gastroesophageal reflux disease)     Hyperlipidemia     Hypertension      Past Surgical History:   Procedure Laterality Date    HIP REPLACEMENT ARTHROPLASTY Right 10/30/2023    Procedure: ARTHROPLASTY, HIP REPLACEMENT;  Surgeon: Deacon Gu III, MD;  Location: [facility];  Service: Orthopedics;  Laterality: Right;    SKIN GRAFT       Family History   Problem Relation Name Age of Onset    Cancer Mother      Cancer Father      Cancer Maternal Aunt      Cancer Maternal Grandmother      Arthritis Paternal Grandmother       Social History     Tobacco Use    Smoking status: Every Day     Current packs/day: 0.50     Types: Cigarettes    Smokeless  tobacco: Never   Substance Use Topics    Alcohol use: Yes    Drug use: Yes     Types: Marijuana     Review of Systems   Constitutional:  Negative for fatigue and fever.   HENT:  Positive for congestion and rhinorrhea. Negative for ear pain and sore throat.    Respiratory:  Positive for cough. Negative for wheezing.    Gastrointestinal:  Negative for abdominal pain, nausea and vomiting.   Musculoskeletal:  Negative for arthralgias and myalgias.   Skin:  Negative for rash.   Neurological:  Negative for headaches.   All other systems reviewed and are negative.      Physical Exam     Initial Vitals [08/13/24 1039]   BP Pulse Resp Temp SpO2   (!) 150/89 84 20 97.7 °F (36.5 °C) 100 %      MAP       --         Physical Exam    Vitals reviewed.  Constitutional: He appears well-developed and well-nourished.   Neck: Neck supple.   Cardiovascular:  Normal rate and regular rhythm.           Pulmonary/Chest: Breath sounds normal.   Abdominal: Abdomen is soft. Bowel sounds are normal. He exhibits no distension and no mass. There is no abdominal tenderness. There is no rebound and no guarding.   Musculoskeletal:         General: Normal range of motion.      Cervical back: Neck supple.     Neurological: He is alert and oriented to person, place, and time. He has normal strength. GCS score is 15. GCS eye subscore is 4. GCS verbal subscore is 5. GCS motor subscore is 6.   Skin: Skin is warm and dry. Capillary refill takes less than 2 seconds.   Psychiatric: He has a normal mood and affect.         Medical Screening Exam   See Full Note    ED Course   Procedures  Labs Reviewed   SARS-COV-2 RNA AMPLIFICATION, QUAL - Abnormal       Result Value    SARS COV-2 Molecular Positive (*)           Imaging Results    None          Medications - No data to display  Medical Decision Making  53-year-old male presents to the emergency department to be evaluated for nasal congestion, cough and chills that began 4 days ago.  He was recently around  someone who tested positive for COVID.  COVID swab is positive  Diagnosis: COVID-19                                      Clinical Impression:   Final diagnoses:  [U07.1] COVID-19 (Primary)        ED Disposition Condition    Discharge Stable          ED Prescriptions    None       Follow-up Information    None          Beena Rockwell, [facility]  08/13/24 1143

## 2025-02-27 ENCOUNTER — TELEPHONE (OUTPATIENT)
Dept: ORTHOPEDICS | Facility: CLINIC | Age: 55
End: 2025-02-27
Payer: COMMERCIAL

## 2025-03-22 ENCOUNTER — HOSPITAL ENCOUNTER (EMERGENCY)
Facility: HOSPITAL | Age: 55
Discharge: HOME | End: 2025-03-22
Payer: COMMERCIAL

## 2025-03-22 VITALS
WEIGHT: 135 LBS | TEMPERATURE: 98 F | BODY MASS INDEX: 18.28 KG/M2 | DIASTOLIC BLOOD PRESSURE: 77 MMHG | RESPIRATION RATE: 18 BRPM | SYSTOLIC BLOOD PRESSURE: 130 MMHG | OXYGEN SATURATION: 100 % | HEIGHT: 72 IN | HEART RATE: 65 BPM

## 2025-03-22 DIAGNOSIS — A08.4 VIRAL GASTROENTERITIS: Primary | ICD-10-CM

## 2025-03-22 DIAGNOSIS — R05.9 COUGH, UNSPECIFIED TYPE: ICD-10-CM

## 2025-03-22 DIAGNOSIS — R07.9 CHEST PAIN: ICD-10-CM

## 2025-03-22 LAB
ALBUMIN SERPL BCP-MCNC: 3.7 G/DL (ref 3.5–5)
ALBUMIN/GLOB SERPL: 0.7 {RATIO}
ALP SERPL-CCNC: 106 U/L (ref 40–150)
ALT SERPL W P-5'-P-CCNC: 10 U/L
ANION GAP SERPL CALCULATED.3IONS-SCNC: 22 MMOL/L (ref 7–16)
AST SERPL W P-5'-P-CCNC: 44 U/L (ref 11–45)
BASOPHILS # BLD AUTO: 0.02 K/UL (ref 0–0.2)
BASOPHILS NFR BLD AUTO: 0.4 % (ref 0–1)
BILIRUB SERPL-MCNC: 0.4 MG/DL
BUN SERPL-MCNC: 13 MG/DL (ref 8–26)
BUN/CREAT SERPL: 9 (ref 6–20)
CALCIUM SERPL-MCNC: 9.7 MG/DL (ref 8.4–10.2)
CHLORIDE SERPL-SCNC: 97 MMOL/L (ref 98–107)
CO2 SERPL-SCNC: 22 MMOL/L (ref 22–29)
CREAT SERPL-MCNC: 1.44 MG/DL (ref 0.72–1.25)
DIFFERENTIAL METHOD BLD: ABNORMAL
EGFR (NO RACE VARIABLE) (RUSH/TITUS): 58 ML/MIN/1.73M2
EOSINOPHIL # BLD AUTO: 0.03 K/UL (ref 0–0.5)
EOSINOPHIL NFR BLD AUTO: 0.6 % (ref 1–4)
ERYTHROCYTE [DISTWIDTH] IN BLOOD BY AUTOMATED COUNT: 13.5 % (ref 11.5–14.5)
GLOBULIN SER-MCNC: 5 G/DL (ref 2–4)
GLUCOSE SERPL-MCNC: 84 MG/DL (ref 74–100)
HCO3 UR-SCNC: 20.7 MMOL/L (ref 21–28)
HCT VFR BLD AUTO: 51.7 % (ref 40–54)
HCT VFR BLD CALC: 55 % (ref 35–51)
HGB BLD-MCNC: 17.1 G/DL (ref 13.5–18)
IMM GRANULOCYTES # BLD AUTO: 0.02 K/UL (ref 0–0.04)
IMM GRANULOCYTES NFR BLD: 0.4 % (ref 0–0.4)
LDH SERPL L TO P-CCNC: 1.1 MMOL/L (ref 0.3–1.2)
LYMPHOCYTES # BLD AUTO: 0.75 K/UL (ref 1–4.8)
LYMPHOCYTES NFR BLD AUTO: 14.3 % (ref 27–41)
MCH RBC QN AUTO: 29.6 PG (ref 27–31)
MCHC RBC AUTO-ENTMCNC: 33.1 G/DL (ref 32–36)
MCV RBC AUTO: 89.4 FL (ref 80–96)
MONOCYTES # BLD AUTO: 0.6 K/UL (ref 0–0.8)
MONOCYTES NFR BLD AUTO: 11.5 % (ref 2–6)
MPC BLD CALC-MCNC: 10.2 FL (ref 9.4–12.4)
NEUTROPHILS # BLD AUTO: 3.81 K/UL (ref 1.8–7.7)
NEUTROPHILS NFR BLD AUTO: 72.8 % (ref 53–65)
NRBC # BLD AUTO: 0 X10E3/UL
NRBC, AUTO (.00): 0 %
PCO2 BLDA: 26 MMHG (ref 35–48)
PH SMN: 7.51 [PH] (ref 7.35–7.45)
PLATELET # BLD AUTO: 239 K/UL (ref 150–400)
PO2 BLDA: 79 MMHG (ref 83–108)
POC BASE EXCESS: -0.4 MMOL/L (ref -2–3)
POC CO2: 21.5 MMOL/L
POC IONIZED CALCIUM: 1.05 MMOL/L (ref 1.15–1.35)
POC SATURATED O2: 97 % (ref 95–98)
POCT GLUCOSE: 102 MG/DL (ref 60–95)
POTASSIUM BLD-SCNC: 3.8 MMOL/L (ref 3.4–4.5)
POTASSIUM SERPL-SCNC: 4.5 MMOL/L (ref 3.5–5.1)
PROT SERPL-MCNC: 8.7 G/DL (ref 6.4–8.3)
RBC # BLD AUTO: 5.78 M/UL (ref 4.6–6.2)
SODIUM BLD-SCNC: 132 MMOL/L (ref 136–145)
SODIUM SERPL-SCNC: 136 MMOL/L (ref 136–145)
TROPONIN I SERPL HS-MCNC: 9.6 NG/L
WBC # BLD AUTO: 5.23 K/UL (ref 4.5–11)

## 2025-03-22 PROCEDURE — 25000003 PHARM REV CODE 250: Performed by: NURSE PRACTITIONER

## 2025-03-22 PROCEDURE — 96375 TX/PRO/DX INJ NEW DRUG ADDON: CPT

## 2025-03-22 PROCEDURE — 93005 ELECTROCARDIOGRAM TRACING: CPT

## 2025-03-22 PROCEDURE — 63600175 PHARM REV CODE 636 W HCPCS: Performed by: NURSE PRACTITIONER

## 2025-03-22 PROCEDURE — 84132 ASSAY OF SERUM POTASSIUM: CPT

## 2025-03-22 PROCEDURE — 36415 COLL VENOUS BLD VENIPUNCTURE: CPT | Performed by: NURSE PRACTITIONER

## 2025-03-22 PROCEDURE — 83605 ASSAY OF LACTIC ACID: CPT

## 2025-03-22 PROCEDURE — 99285 EMERGENCY DEPT VISIT HI MDM: CPT | Mod: 25

## 2025-03-22 PROCEDURE — 82330 ASSAY OF CALCIUM: CPT

## 2025-03-22 PROCEDURE — 85014 HEMATOCRIT: CPT

## 2025-03-22 PROCEDURE — 96365 THER/PROPH/DIAG IV INF INIT: CPT

## 2025-03-22 PROCEDURE — 82803 BLOOD GASES ANY COMBINATION: CPT

## 2025-03-22 PROCEDURE — 84484 ASSAY OF TROPONIN QUANT: CPT | Performed by: NURSE PRACTITIONER

## 2025-03-22 PROCEDURE — 82947 ASSAY GLUCOSE BLOOD QUANT: CPT

## 2025-03-22 PROCEDURE — 84295 ASSAY OF SERUM SODIUM: CPT | Mod: 91

## 2025-03-22 PROCEDURE — 80053 COMPREHEN METABOLIC PANEL: CPT | Performed by: NURSE PRACTITIONER

## 2025-03-22 PROCEDURE — 85025 COMPLETE CBC W/AUTO DIFF WBC: CPT | Performed by: NURSE PRACTITIONER

## 2025-03-22 RX ORDER — MORPHINE SULFATE 4 MG/ML
4 INJECTION, SOLUTION INTRAMUSCULAR; INTRAVENOUS ONCE
Status: COMPLETED | OUTPATIENT
Start: 2025-03-22 | End: 2025-03-22

## 2025-03-22 RX ORDER — ALBUTEROL SULFATE 90 UG/1
1-2 INHALANT RESPIRATORY (INHALATION) EVERY 6 HOURS PRN
Qty: 8.5 G | Refills: 0 | Status: SHIPPED | OUTPATIENT
Start: 2025-03-22 | End: 2026-03-22

## 2025-03-22 RX ORDER — ONDANSETRON 4 MG/1
4 TABLET, FILM COATED ORAL EVERY 6 HOURS
Qty: 12 TABLET | Refills: 0 | Status: SHIPPED | OUTPATIENT
Start: 2025-03-22

## 2025-03-22 RX ORDER — PROMETHAZINE HYDROCHLORIDE 25 MG/1
25 TABLET ORAL EVERY 6 HOURS PRN
Qty: 15 TABLET | Refills: 0 | Status: SHIPPED | OUTPATIENT
Start: 2025-03-22

## 2025-03-22 RX ADMIN — SODIUM CHLORIDE 1000 ML: 9 INJECTION, SOLUTION INTRAVENOUS at 02:03

## 2025-03-22 RX ADMIN — PROMETHAZINE HYDROCHLORIDE 25 MG: 25 INJECTION INTRAMUSCULAR; INTRAVENOUS at 02:03

## 2025-03-22 RX ADMIN — MORPHINE SULFATE 4 MG: 4 INJECTION INTRAVENOUS at 03:03

## 2025-03-22 NOTE — ED PROVIDER NOTES
Encounter Date: 3/22/2025       History     Chief Complaint   Patient presents with    Chest Pain     Presents to ED c/o chest pain, cough, bilateral shoulder pain and vomiting that has been ongoing x2 days. Hx of COPD and lung cancer.    Vomiting     55y/o male presents to ED with c/o headache, chest pain-burning with productive cough, nausea, vomiting, diarrhea that started two days ago.  States had only been able to drink water.  Has been vomiting about four times or more and watery green diarrhea stools without blood about four times per day for two days.  Denies fever, shortness of breath, jaw pain or pain radiating to his left arm.  Admits to cough.  Has been out of his inhaler. Treated symptoms with Delsym and Aleve.  Denies hx of CAD or stent placement.  Has not seen cardiologist in the past.  Admits to history of GERD.  Smokes approx one-half ppd for 40 years.        Review of patient's allergies indicates:   Allergen Reactions    Shellfish containing products Shortness Of Breath and Swelling    Castellani paint [phenol]     Nuts [tree nut]      Past Medical History:   Diagnosis Date    Anxiety and depression     Avascular necrosis of bone of right hip 10/4/2023    Pt has had hip pain for more than 2 years. Pt reports recent fall approximately 2 mos ago. States he was evaluated at [facility]. Instructed to follow up with ORTHO but since has not been able to do so.     COPD (chronic obstructive pulmonary disease) 9/27/2023    Coronary artery disease     GERD (gastroesophageal reflux disease)     Hyperlipidemia     Hypertension      Past Surgical History:   Procedure Laterality Date    HIP REPLACEMENT ARTHROPLASTY Right 10/30/2023    Procedure: ARTHROPLASTY, HIP REPLACEMENT;  Surgeon: Deacon Gu III, MD;  Location: [facility];  Service: Orthopedics;  Laterality: Right;    SKIN GRAFT       Family History   Problem Relation Name Age of Onset    Cancer Mother      Cancer Father      Cancer Maternal Aunt       Cancer Maternal Grandmother      Arthritis Paternal Grandmother       Social History[1]  Review of Systems   Constitutional:  Positive for appetite change. Negative for chills and fever.   HENT:  Positive for congestion.    Respiratory:  Positive for cough. Negative for shortness of breath.    Cardiovascular:  Positive for chest pain. Negative for palpitations.        See HPI   Gastrointestinal:  Positive for abdominal pain, diarrhea, nausea and vomiting. Negative for blood in stool.        See HPI   Genitourinary:  Negative for dysuria.   All other systems reviewed and are negative.      Physical Exam     Initial Vitals [03/22/25 1332]   BP Pulse Resp Temp SpO2   (!) 139/93 63 13 98.2 °F (36.8 °C) 98 %      MAP       --         Physical Exam    Nursing note and vitals reviewed.  Constitutional: He appears well-developed and well-nourished.   HENT:   Head: Normocephalic.   Nose: Nose normal. Mouth/Throat: Oropharynx is clear and moist. No oropharyngeal exudate.   Eyes: Conjunctivae are normal.   Neck: Neck supple.   Cardiovascular:  Normal rate, regular rhythm and normal heart sounds.           No murmur heard.  Pulmonary/Chest: He has no rhonchi. He has rales.   Abdominal: Abdomen is soft. Bowel sounds are normal. He exhibits no distension. There is abdominal tenderness. There is rebound and guarding.   Musculoskeletal:         General: Normal range of motion.      Cervical back: Neck supple.     Neurological: He is alert and oriented to person, place, and time.   Skin: Skin is warm and dry.   Psychiatric: He has a normal mood and affect.         Medical Screening Exam   See Full Note    ED Course   Procedures  Labs Reviewed   COMPREHENSIVE METABOLIC PANEL - Abnormal       Result Value    Sodium 136      Potassium 4.5      Chloride 97 (*)     CO2 22      Anion Gap 22 (*)     Glucose 84      BUN 13      Creatinine 1.44 (*)     BUN/Creatinine Ratio 9      Calcium 9.7      Total Protein 8.7 (*)     Albumin 3.7       Globulin 5.0 (*)     A/G Ratio 0.7      Bilirubin, Total 0.4      Alk Phos 106      ALT 10      AST 44      eGFR 58 (*)    CBC WITH DIFFERENTIAL - Abnormal    WBC 5.23      RBC 5.78      Hemoglobin 17.1      Hematocrit 51.7      MCV 89.4      MCH 29.6      MCHC 33.1      RDW 13.5      Platelet Count 239      MPV 10.2      Neutrophils % 72.8 (*)     Lymphocytes % 14.3 (*)     Monocytes % 11.5 (*)     Eosinophils % 0.6 (*)     Basophils % 0.4      Immature Granulocytes % 0.4      nRBC, Auto 0.0      Neutrophils, Abs 3.81      Lymphocytes, Absolute 0.75 (*)     Monocytes, Absolute 0.60      Eosinophils, Absolute 0.03      Basophils, Absolute 0.02      Immature Granulocytes, Absolute 0.02      nRBC, Absolute 0.00      Diff Type Auto     TROPONIN I - Normal    Troponin I High Sensitivity 9.6     CBC W/ AUTO DIFFERENTIAL    Narrative:     The following orders were created for panel order CBC auto differential.  Procedure                               Abnormality         Status                     ---------                               -----------         ------                     CBC with Differential[[id number]]       Abnormal            Final result                 Please view results for these tests on the individual orders.     EKG Readings: (Independently Interpreted)   12 lead EKG done at 1331 reviewed by Dr. Nino.  Ventric Rate 63  Sinus arrhythmia with PVC's.  No acute changes.       Imaging Results              XR ABDOMEN, ACUTE 2 OR MORE VIEWS WITH CHEST (In process)                      Medications   sodium chloride 0.9% bolus 1,000 mL 1,000 mL (0 mLs Intravenous Stopped 3/22/25 1508)   promethazine (PHENERGAN) 25 mg in 0.9% NaCl 50 mL IVPB (0 mg Intravenous Stopped 3/22/25 1508)   morphine injection 4 mg (4 mg Intravenous Given 3/22/25 1503)     Medical Decision Making  55y/o male presents to ED with c/o headache, chest pain-burning with productive cough, nausea, vomiting, diarrhea that started two days  ago.  States had only been able to drink water.  Has been vomiting about four times or more and watery green diarrhea stools without blood about four times per day for two days.  Denies fever, shortness of breath, jaw pain or pain radiating to his left arm.  Treated symptoms with Delsym and Aleve.  Denies hx of CAD or stent placement.  Has not seen cardiologist in the past.  Admits to history of GERD.  Smokes approx one-half ppd for 40 years.      Results of labs and imaging reviewed with pt and wife.  Feels better after IVF and phenergan.  Burning in his chest has resolved. Has not vomited or had diarrhea while in ED.  Will give rx for Zofran ODT and Phenergan.  Advised can get Imodium otc prn for diarrhea. To increase fluids.  Pedialyte, Gatorade, jello and advance diet as tolerated.  Counseled on smoking cessation.  Requests refill on ventolin HFA.  Advised to get established with pcp.  Will give list of physicians in [facility] system.      Amount and/or Complexity of Data Reviewed  Labs: ordered.  Radiology: ordered.    Risk  Prescription drug management.                                      Clinical Impression:   Final diagnoses:  [R07.9] Chest pain  [A08.4] Viral gastroenteritis (Primary)  [R05.9] Cough, unspecified type        ED Disposition Condition    Discharge Stable          ED Prescriptions       Medication Sig Dispense Start Date End Date Auth. Provider    ondansetron (ZOFRAN) 4 MG tablet Take 1 tablet (4 mg total) by mouth every 6 (six) hours. 12 tablet 3/22/2025 -- Liz Blandon FNP    promethazine (PHENERGAN) 25 MG tablet Take 1 tablet (25 mg total) by mouth every 6 (six) hours as needed for Nausea. 15 tablet 3/22/2025 -- Liz Blandon FNP    albuterol (PROVENTIL/VENTOLIN HFA) 90 mcg/actuation inhaler Inhale 1-2 puffs into the lungs every 6 (six) hours as needed for Shortness of Breath (cough). Rescue 8.5 g 3/22/2025 3/22/2026 Liz Blandon FNP          Follow-up Information        Follow up With Specialties Details Why Contact Info    Alejandro Miller, DO Family Medicine In 1 week  [address] [facility] [place] [address] [phone]                   [1]   Social History  Tobacco Use    Smoking status: Every Day     Current packs/day: 0.50     Types: Cigarettes    Smokeless tobacco: Never   Substance Use Topics    Alcohol use: Yes    Drug use: Yes     Types: Marijuana        Liz Blandon, SADIA  03/22/25 1600

## 2025-03-22 NOTE — DISCHARGE INSTRUCTIONS
Increase fluids.  Clear liquids and advance diet as tolerated.  Pedialyte, gatorade, jello  Imodium otc as needed for diarrhea.  Good handwashing.  Get established with pcp for primary care.  Will give list.  Return to ER for worsening symptoms or any problems.

## (undated) DEVICE — GLOVE 7.5 PROTEXIS PI BLUE

## (undated) DEVICE — GLOVE BIOGEL SKINSENSE PI 7.0

## (undated) DEVICE — GLOVE 7.0 PROTEXIS PI BLUE

## (undated) DEVICE — Device

## (undated) DEVICE — SUT CTD VICRYL 0 UND BR CT

## (undated) DEVICE — STAPLER SKIN WIDE

## (undated) DEVICE — SUT 2-0 VICRYL / CT-1

## (undated) DEVICE — OVERLAY MATTRESS WAFFLE

## (undated) DEVICE — BETADINE SOLUTION SCRUB 8OZ

## (undated) DEVICE — SUT #2 TI-CRON HGS-21 30IN

## (undated) DEVICE — GLOVE BIOGEL SKINSENSE PI 8.0

## (undated) DEVICE — DRAPE INCISE IOBAN 2 23X23IN

## (undated) DEVICE — CANISTER SUCTION 2 LTR

## (undated) DEVICE — GOWN TOGA SYS PEELWY ZIP 2 XL

## (undated) DEVICE — KIT IRR SUCTION HND PIECE

## (undated) DEVICE — SOL NACL IRR 3000ML

## (undated) DEVICE — GLOVE BIOGEL SKINSENSE PI 6.5

## (undated) DEVICE — GLOVE BIOGEL SKINSENSE PI 7.5

## (undated) DEVICE — SOLIDIFIER BTL W/TREAT 1500CC

## (undated) DEVICE — DRESSING AQUACEL FOAM RECT 6X6

## (undated) DEVICE — SOL NACL IRR 1000ML BTL

## (undated) DEVICE — SPONGE COTTON TRAY 4X4IN

## (undated) DEVICE — TUBE SUCTION KAMVAC MINI 20/BX

## (undated) DEVICE — SCRUB DYNA-HEX LIQ 4% CHG 4OZ

## (undated) DEVICE — TRAY SKIN SCRUB WET PREMIUM